# Patient Record
Sex: FEMALE | Race: BLACK OR AFRICAN AMERICAN | NOT HISPANIC OR LATINO
[De-identification: names, ages, dates, MRNs, and addresses within clinical notes are randomized per-mention and may not be internally consistent; named-entity substitution may affect disease eponyms.]

---

## 2018-11-26 ENCOUNTER — RESULT REVIEW (OUTPATIENT)
Age: 28
End: 2018-11-26

## 2019-09-04 ENCOUNTER — RESULT REVIEW (OUTPATIENT)
Age: 29
End: 2019-09-04

## 2020-04-26 ENCOUNTER — MESSAGE (OUTPATIENT)
Age: 30
End: 2020-04-26

## 2020-05-05 LAB
SARS-COV-2 IGG SERPL IA-ACNC: 5.1 INDEX
SARS-COV-2 IGG SERPL QL IA: POSITIVE

## 2020-07-12 ENCOUNTER — TRANSCRIPTION ENCOUNTER (OUTPATIENT)
Age: 30
End: 2020-07-12

## 2020-08-07 ENCOUNTER — APPOINTMENT (OUTPATIENT)
Dept: OBGYN | Facility: CLINIC | Age: 30
End: 2020-08-07
Payer: COMMERCIAL

## 2020-08-07 DIAGNOSIS — Z30.431 ENCOUNTER FOR ROUTINE CHECKING OF INTRAUTERINE CONTRACEPTIVE DEVICE: ICD-10-CM

## 2020-08-07 PROCEDURE — 99213 OFFICE O/P EST LOW 20 MIN: CPT

## 2020-08-07 NOTE — PHYSICAL EXAM
[Normal] : clitoris [Labia Majora] : labia major [No Bleeding] : there was no active vaginal bleeding [Labia Minora] : labia minora [IUD String] : had an IUD string protruding out [Normal Position] : in a normal position [Uterine Adnexae] : were not tender and not enlarged [Motion Tenderness] : there was no cervical motion tenderness [Pap Obtained] : a Pap smear was not performed [Enlarged ___ wks] : not enlarged [Tenderness] : nontender [FreeTextEntry5] : IUD STRING VISIBLE FROM THE EXTERNAL OS AND IS ABOUT 2-3 CM FROM THE OS [Ovarian Mass (___ Cm)] : there were no adnexal masses

## 2020-11-02 LAB — SARS-COV-2 N GENE NPH QL NAA+PROBE: NOT DETECTED

## 2021-02-17 ENCOUNTER — RESULT REVIEW (OUTPATIENT)
Age: 31
End: 2021-02-17

## 2021-03-02 ENCOUNTER — APPOINTMENT (OUTPATIENT)
Dept: OBGYN | Facility: CLINIC | Age: 31
End: 2021-03-02

## 2021-08-27 ENCOUNTER — TRANSCRIPTION ENCOUNTER (OUTPATIENT)
Age: 31
End: 2021-08-27

## 2022-01-28 ENCOUNTER — APPOINTMENT (OUTPATIENT)
Dept: HUMAN REPRODUCTION | Facility: CLINIC | Age: 32
End: 2022-01-28
Payer: COMMERCIAL

## 2022-01-28 PROCEDURE — 99205 OFFICE O/P NEW HI 60 MIN: CPT | Mod: 95

## 2022-05-06 ENCOUNTER — APPOINTMENT (OUTPATIENT)
Dept: HUMAN REPRODUCTION | Facility: CLINIC | Age: 32
End: 2022-05-06

## 2022-07-08 ENCOUNTER — APPOINTMENT (OUTPATIENT)
Dept: ANTEPARTUM | Facility: CLINIC | Age: 32
End: 2022-07-08

## 2022-07-08 ENCOUNTER — ASOB RESULT (OUTPATIENT)
Age: 32
End: 2022-07-08

## 2022-07-08 PROCEDURE — 76817 TRANSVAGINAL US OBSTETRIC: CPT

## 2022-07-08 PROCEDURE — 76801 OB US < 14 WKS SINGLE FETUS: CPT

## 2022-07-10 ENCOUNTER — NON-APPOINTMENT (OUTPATIENT)
Age: 32
End: 2022-07-10

## 2022-07-27 ENCOUNTER — APPOINTMENT (OUTPATIENT)
Dept: OBGYN | Facility: CLINIC | Age: 32
End: 2022-07-27

## 2022-07-27 VITALS
HEIGHT: 67 IN | WEIGHT: 194 LBS | SYSTOLIC BLOOD PRESSURE: 115 MMHG | HEART RATE: 73 BPM | BODY MASS INDEX: 30.45 KG/M2 | DIASTOLIC BLOOD PRESSURE: 77 MMHG

## 2022-07-27 DIAGNOSIS — Z11.3 ENCOUNTER FOR SCREENING FOR INFECTIONS WITH A PREDOMINANTLY SEXUAL MODE OF TRANSMISSION: ICD-10-CM

## 2022-07-27 DIAGNOSIS — Z13.21 ENCOUNTER FOR SCREENING FOR NUTRITIONAL DISORDER: ICD-10-CM

## 2022-07-27 DIAGNOSIS — E55.9 VITAMIN D DEFICIENCY, UNSPECIFIED: ICD-10-CM

## 2022-07-27 DIAGNOSIS — Z11.4 ENCOUNTER FOR SCREENING FOR HUMAN IMMUNODEFICIENCY VIRUS [HIV]: ICD-10-CM

## 2022-07-27 DIAGNOSIS — O09.811 SUPERVISION OF PREGNANCY RESULTING FROM ASSISTED REPRODUCTIVE TECHNOLOGY, FIRST TRIMESTER: ICD-10-CM

## 2022-07-27 DIAGNOSIS — Z34.81 ENCOUNTER FOR SUPERVISION OF OTHER NORMAL PREGNANCY, FIRST TRIMESTER: ICD-10-CM

## 2022-07-27 DIAGNOSIS — O34.81 MATERNAL CARE FOR OTHER ABNORMALITIES OF PELVIC ORGANS, FIRST TRIMESTER: ICD-10-CM

## 2022-07-27 PROCEDURE — 36415 COLL VENOUS BLD VENIPUNCTURE: CPT

## 2022-07-27 PROCEDURE — 0500F INITIAL PRENATAL CARE VISIT: CPT

## 2022-07-31 LAB
25(OH)D3 SERPL-MCNC: 38.7 NG/ML
ABO + RH PNL BLD: NORMAL
B19V IGG SER QL IA: 0.68 INDEX
B19V IGG+IGM SER-IMP: NEGATIVE
B19V IGG+IGM SER-IMP: NORMAL
B19V IGM FLD-ACNC: 0.09 INDEX
B19V IGM SER-ACNC: NEGATIVE
BACTERIA UR CULT: NORMAL
BASOPHILS # BLD AUTO: 0.07 K/UL
BASOPHILS NFR BLD AUTO: 0.4 %
BLD GP AB SCN SERPL QL: NORMAL
C TRACH RRNA SPEC QL NAA+PROBE: NOT DETECTED
EOSINOPHIL # BLD AUTO: 0.09 K/UL
EOSINOPHIL NFR BLD AUTO: 0.6 %
ESTIMATED AVERAGE GLUCOSE: 103 MG/DL
HBA1C MFR BLD HPLC: 5.2 %
HBV SURFACE AG SER QL: NONREACTIVE
HCT VFR BLD CALC: 37.6 %
HCV AB SER QL: NONREACTIVE
HCV S/CO RATIO: 0.11 S/CO
HGB A MFR BLD: 97.1 %
HGB A2 MFR BLD: 2.9 %
HGB BLD-MCNC: 12.1 G/DL
HGB FRACT BLD-IMP: NORMAL
HIV1+2 AB SPEC QL IA.RAPID: NONREACTIVE
IMM GRANULOCYTES NFR BLD AUTO: 0.4 %
LEAD BLD-MCNC: <1 UG/DL
LYMPHOCYTES # BLD AUTO: 2.22 K/UL
LYMPHOCYTES NFR BLD AUTO: 14.2 %
MAN DIFF?: NORMAL
MCHC RBC-ENTMCNC: 29.4 PG
MCHC RBC-ENTMCNC: 32.2 GM/DL
MCV RBC AUTO: 91.5 FL
MEV IGG FLD QL IA: 55.2 AU/ML
MEV IGG+IGM SER-IMP: POSITIVE
MONOCYTES # BLD AUTO: 1.22 K/UL
MONOCYTES NFR BLD AUTO: 7.8 %
MUV AB SER-ACNC: POSITIVE
MUV IGG SER QL IA: 88.5 AU/ML
N GONORRHOEA RRNA SPEC QL NAA+PROBE: NOT DETECTED
NEUTROPHILS # BLD AUTO: 11.97 K/UL
NEUTROPHILS NFR BLD AUTO: 76.6 %
PLATELET # BLD AUTO: 338 K/UL
RBC # BLD: 4.11 M/UL
RBC # FLD: 13.5 %
RUBV IGG FLD-ACNC: 5.6 INDEX
RUBV IGG SER-IMP: POSITIVE
SOURCE AMPLIFICATION: NORMAL
T PALLIDUM AB SER QL IA: NEGATIVE
TSH SERPL-ACNC: 0.66 UIU/ML
VZV AB TITR SER: POSITIVE
VZV IGG SER IF-ACNC: 1146 INDEX
WBC # FLD AUTO: 15.64 K/UL

## 2022-08-01 ENCOUNTER — NON-APPOINTMENT (OUTPATIENT)
Age: 32
End: 2022-08-01

## 2022-08-01 ENCOUNTER — APPOINTMENT (OUTPATIENT)
Dept: OBGYN | Facility: CLINIC | Age: 32
End: 2022-08-01

## 2022-08-01 DIAGNOSIS — R82.90 UNSPECIFIED ABNORMAL FINDINGS IN URINE: ICD-10-CM

## 2022-08-01 PROCEDURE — 36415 COLL VENOUS BLD VENIPUNCTURE: CPT

## 2022-08-07 LAB — BACTERIA UR CULT: NORMAL

## 2022-08-11 ENCOUNTER — NON-APPOINTMENT (OUTPATIENT)
Age: 32
End: 2022-08-11

## 2022-08-12 ENCOUNTER — NON-APPOINTMENT (OUTPATIENT)
Age: 32
End: 2022-08-12

## 2022-08-12 ENCOUNTER — APPOINTMENT (OUTPATIENT)
Dept: ANTEPARTUM | Facility: CLINIC | Age: 32
End: 2022-08-12

## 2022-08-12 ENCOUNTER — LABORATORY RESULT (OUTPATIENT)
Age: 32
End: 2022-08-12

## 2022-08-12 ENCOUNTER — ASOB RESULT (OUTPATIENT)
Age: 32
End: 2022-08-12

## 2022-08-12 PROCEDURE — 59015 CHORION BIOPSY: CPT

## 2022-08-12 PROCEDURE — 76945 ECHO GUIDE VILLUS SAMPLING: CPT

## 2022-08-12 PROCEDURE — 76813 OB US NUCHAL MEAS 1 GEST: CPT

## 2022-08-15 ENCOUNTER — APPOINTMENT (OUTPATIENT)
Dept: ANTEPARTUM | Facility: CLINIC | Age: 32
End: 2022-08-15

## 2022-08-15 ENCOUNTER — NON-APPOINTMENT (OUTPATIENT)
Age: 32
End: 2022-08-15

## 2022-08-29 RX ORDER — DOXYLAMINE SUCCINATE AND PYRIDOXINE HYDROCHLORIDE 10; 10 MG/1; MG/1
10-10 TABLET, DELAYED RELEASE ORAL
Qty: 60 | Refills: 2 | Status: ACTIVE | COMMUNITY
Start: 2022-08-29 | End: 1900-01-01

## 2022-09-12 ENCOUNTER — NON-APPOINTMENT (OUTPATIENT)
Age: 32
End: 2022-09-12

## 2022-09-12 ENCOUNTER — APPOINTMENT (OUTPATIENT)
Dept: OBGYN | Facility: CLINIC | Age: 32
End: 2022-09-12

## 2022-09-12 VITALS
BODY MASS INDEX: 31.23 KG/M2 | DIASTOLIC BLOOD PRESSURE: 78 MMHG | SYSTOLIC BLOOD PRESSURE: 118 MMHG | HEART RATE: 82 BPM | HEIGHT: 67 IN | WEIGHT: 199 LBS

## 2022-09-12 DIAGNOSIS — Z34.92 ENCOUNTER FOR SUPERVISION OF NORMAL PREGNANCY, UNSPECIFIED, SECOND TRIMESTER: ICD-10-CM

## 2022-09-12 PROCEDURE — 36415 COLL VENOUS BLD VENIPUNCTURE: CPT

## 2022-09-12 PROCEDURE — 0502F SUBSEQUENT PRENATAL CARE: CPT

## 2022-09-13 LAB — HPV HIGH+LOW RISK DNA PNL CVX: DETECTED

## 2022-09-17 LAB — AFP PNL SERPL: NORMAL

## 2022-09-23 LAB — CYTOLOGY CVX/VAG DOC THIN PREP: ABNORMAL

## 2022-09-28 ENCOUNTER — NON-APPOINTMENT (OUTPATIENT)
Age: 32
End: 2022-09-28

## 2022-09-29 ENCOUNTER — NON-APPOINTMENT (OUTPATIENT)
Age: 32
End: 2022-09-29

## 2022-10-06 ENCOUNTER — ASOB RESULT (OUTPATIENT)
Age: 32
End: 2022-10-06

## 2022-10-06 ENCOUNTER — APPOINTMENT (OUTPATIENT)
Dept: ANTEPARTUM | Facility: CLINIC | Age: 32
End: 2022-10-06

## 2022-10-06 PROCEDURE — 76811 OB US DETAILED SNGL FETUS: CPT

## 2022-10-11 ENCOUNTER — APPOINTMENT (OUTPATIENT)
Dept: OBGYN | Facility: CLINIC | Age: 32
End: 2022-10-11

## 2022-10-19 ENCOUNTER — APPOINTMENT (OUTPATIENT)
Dept: OBGYN | Facility: CLINIC | Age: 32
End: 2022-10-19

## 2022-10-19 PROCEDURE — 0502F SUBSEQUENT PRENATAL CARE: CPT

## 2022-10-19 PROCEDURE — 57454 BX/CURETT OF CERVIX W/SCOPE: CPT

## 2022-10-21 ENCOUNTER — APPOINTMENT (OUTPATIENT)
Dept: PEDIATRIC CARDIOLOGY | Facility: CLINIC | Age: 32
End: 2022-10-21

## 2022-10-21 PROCEDURE — 93325 DOPPLER ECHO COLOR FLOW MAPG: CPT | Mod: 59

## 2022-10-21 PROCEDURE — 76820 UMBILICAL ARTERY ECHO: CPT

## 2022-10-21 PROCEDURE — 76827 ECHO EXAM OF FETAL HEART: CPT

## 2022-10-21 PROCEDURE — 76825 ECHO EXAM OF FETAL HEART: CPT

## 2022-10-21 PROCEDURE — 99241 OFFICE CONSULTATION NEW/ESTAB PATIENT 15 MIN: CPT | Mod: 25

## 2022-11-18 ENCOUNTER — NON-APPOINTMENT (OUTPATIENT)
Age: 32
End: 2022-11-18

## 2022-11-21 ENCOUNTER — APPOINTMENT (OUTPATIENT)
Dept: OBGYN | Facility: CLINIC | Age: 32
End: 2022-11-21

## 2022-11-28 ENCOUNTER — NON-APPOINTMENT (OUTPATIENT)
Age: 32
End: 2022-11-28

## 2022-11-28 ENCOUNTER — APPOINTMENT (OUTPATIENT)
Dept: OBGYN | Facility: CLINIC | Age: 32
End: 2022-11-28

## 2022-11-28 DIAGNOSIS — Z13.1 ENCOUNTER FOR SCREENING FOR DIABETES MELLITUS: ICD-10-CM

## 2022-11-28 DIAGNOSIS — Z11.3 ENCOUNTER FOR SCREENING FOR INFECTIONS WITH A PREDOMINANTLY SEXUAL MODE OF TRANSMISSION: ICD-10-CM

## 2022-11-28 DIAGNOSIS — Z23 ENCOUNTER FOR IMMUNIZATION: ICD-10-CM

## 2022-11-28 DIAGNOSIS — Z13.21 ENCOUNTER FOR SCREENING FOR NUTRITIONAL DISORDER: ICD-10-CM

## 2022-11-28 DIAGNOSIS — E55.9 VITAMIN D DEFICIENCY, UNSPECIFIED: ICD-10-CM

## 2022-11-28 DIAGNOSIS — O09.812 SUPERVISION OF PREGNANCY RESULTING FROM ASSISTED REPRODUCTIVE TECHNOLOGY, SECOND TRIMESTER: ICD-10-CM

## 2022-11-28 DIAGNOSIS — Z11.4 ENCOUNTER FOR SCREENING FOR HUMAN IMMUNODEFICIENCY VIRUS [HIV]: ICD-10-CM

## 2022-11-28 DIAGNOSIS — D64.9 ANEMIA, UNSPECIFIED: ICD-10-CM

## 2022-11-28 DIAGNOSIS — Z01.84 ENCOUNTER FOR ANTIBODY RESPONSE EXAMINATION: ICD-10-CM

## 2022-11-28 DIAGNOSIS — Z34.83 ENCOUNTER FOR SUPERVISION OF OTHER NORMAL PREGNANCY, THIRD TRIMESTER: ICD-10-CM

## 2022-11-28 PROCEDURE — 90471 IMMUNIZATION ADMIN: CPT

## 2022-11-28 PROCEDURE — 90715 TDAP VACCINE 7 YRS/> IM: CPT

## 2022-11-28 PROCEDURE — 36415 COLL VENOUS BLD VENIPUNCTURE: CPT

## 2022-11-28 PROCEDURE — 0502F SUBSEQUENT PRENATAL CARE: CPT

## 2022-11-29 LAB
25(OH)D3 SERPL-MCNC: 36.5 NG/ML
BASOPHILS # BLD AUTO: 0.06 K/UL
BASOPHILS NFR BLD AUTO: 0.4 %
BLD GP AB SCN SERPL QL: NORMAL
EOSINOPHIL # BLD AUTO: 0.16 K/UL
EOSINOPHIL NFR BLD AUTO: 1.2 %
GLUCOSE 1H P 50 G GLC PO SERPL-MCNC: 81 MG/DL
HCT VFR BLD CALC: 34.9 %
HGB BLD-MCNC: 11.1 G/DL
IMM GRANULOCYTES NFR BLD AUTO: 1 %
LYMPHOCYTES # BLD AUTO: 1.89 K/UL
LYMPHOCYTES NFR BLD AUTO: 14 %
MAN DIFF?: NORMAL
MCHC RBC-ENTMCNC: 28.8 PG
MCHC RBC-ENTMCNC: 31.8 GM/DL
MCV RBC AUTO: 90.4 FL
MONOCYTES # BLD AUTO: 1.18 K/UL
MONOCYTES NFR BLD AUTO: 8.7 %
NEUTROPHILS # BLD AUTO: 10.11 K/UL
NEUTROPHILS NFR BLD AUTO: 74.7 %
PLATELET # BLD AUTO: 310 K/UL
RBC # BLD: 3.86 M/UL
RBC # FLD: 13.4 %
WBC # FLD AUTO: 13.53 K/UL

## 2022-11-30 LAB
HCV AB SER QL: NONREACTIVE
HCV S/CO RATIO: 0.08 S/CO
HIV1+2 AB SPEC QL IA.RAPID: NONREACTIVE
T PALLIDUM AB SER QL IA: NEGATIVE

## 2022-12-22 ENCOUNTER — NON-APPOINTMENT (OUTPATIENT)
Age: 32
End: 2022-12-22

## 2022-12-22 ENCOUNTER — APPOINTMENT (OUTPATIENT)
Dept: OBGYN | Facility: CLINIC | Age: 32
End: 2022-12-22

## 2022-12-22 VITALS
WEIGHT: 228 LBS | BODY MASS INDEX: 35.79 KG/M2 | HEIGHT: 67 IN | DIASTOLIC BLOOD PRESSURE: 64 MMHG | SYSTOLIC BLOOD PRESSURE: 124 MMHG

## 2022-12-22 PROCEDURE — 0502F SUBSEQUENT PRENATAL CARE: CPT

## 2022-12-30 ENCOUNTER — APPOINTMENT (OUTPATIENT)
Dept: ANTEPARTUM | Facility: CLINIC | Age: 32
End: 2022-12-30
Payer: COMMERCIAL

## 2022-12-30 ENCOUNTER — ASOB RESULT (OUTPATIENT)
Age: 32
End: 2022-12-30

## 2022-12-30 PROCEDURE — 76816 OB US FOLLOW-UP PER FETUS: CPT

## 2022-12-30 PROCEDURE — 76819 FETAL BIOPHYS PROFIL W/O NST: CPT | Mod: 59

## 2023-01-11 ENCOUNTER — APPOINTMENT (OUTPATIENT)
Dept: OBGYN | Facility: CLINIC | Age: 33
End: 2023-01-11
Payer: COMMERCIAL

## 2023-01-11 ENCOUNTER — NON-APPOINTMENT (OUTPATIENT)
Age: 33
End: 2023-01-11

## 2023-01-11 PROCEDURE — 0502F SUBSEQUENT PRENATAL CARE: CPT

## 2023-01-11 PROCEDURE — 90686 IIV4 VACC NO PRSV 0.5 ML IM: CPT

## 2023-01-11 PROCEDURE — 90471 IMMUNIZATION ADMIN: CPT

## 2023-01-18 ENCOUNTER — NON-APPOINTMENT (OUTPATIENT)
Age: 33
End: 2023-01-18

## 2023-01-23 ENCOUNTER — APPOINTMENT (OUTPATIENT)
Dept: OBGYN | Facility: CLINIC | Age: 33
End: 2023-01-23
Payer: COMMERCIAL

## 2023-01-23 DIAGNOSIS — Z36.85 ENCOUNTER FOR ANTENATAL SCREENING FOR STREPTOCOCCUS B: ICD-10-CM

## 2023-01-23 PROCEDURE — 0502F SUBSEQUENT PRENATAL CARE: CPT

## 2023-01-25 LAB
GP B STREP DNA SPEC QL NAA+PROBE: DETECTED
SOURCE GBS: NORMAL

## 2023-02-01 ENCOUNTER — APPOINTMENT (OUTPATIENT)
Dept: OBGYN | Facility: CLINIC | Age: 33
End: 2023-02-01
Payer: COMMERCIAL

## 2023-02-01 DIAGNOSIS — O34.219 MATERNAL CARE FOR UNSPECIFIED TYPE SCAR FROM PREVIOUS CESAREAN DELIVERY: ICD-10-CM

## 2023-02-01 DIAGNOSIS — O09.813 SUPERVISION OF PREGNANCY RESULTING FROM ASSISTED REPRODUCTIVE TECHNOLOGY, THIRD TRIMESTER: ICD-10-CM

## 2023-02-01 PROCEDURE — 0502F SUBSEQUENT PRENATAL CARE: CPT

## 2023-02-06 ENCOUNTER — APPOINTMENT (OUTPATIENT)
Dept: OBGYN | Facility: CLINIC | Age: 33
End: 2023-02-06

## 2023-02-09 ENCOUNTER — OUTPATIENT (OUTPATIENT)
Dept: OUTPATIENT SERVICES | Facility: HOSPITAL | Age: 33
LOS: 1 days | End: 2023-02-09
Payer: COMMERCIAL

## 2023-02-09 VITALS
TEMPERATURE: 98 F | HEIGHT: 67 IN | RESPIRATION RATE: 20 BRPM | HEART RATE: 101 BPM | SYSTOLIC BLOOD PRESSURE: 117 MMHG | DIASTOLIC BLOOD PRESSURE: 78 MMHG | WEIGHT: 231.93 LBS | OXYGEN SATURATION: 98 %

## 2023-02-09 DIAGNOSIS — Z01.818 ENCOUNTER FOR OTHER PREPROCEDURAL EXAMINATION: ICD-10-CM

## 2023-02-09 DIAGNOSIS — Z98.891 HISTORY OF UTERINE SCAR FROM PREVIOUS SURGERY: Chronic | ICD-10-CM

## 2023-02-09 DIAGNOSIS — O34.219 MATERNAL CARE FOR UNSPECIFIED TYPE SCAR FROM PREVIOUS CESAREAN DELIVERY: ICD-10-CM

## 2023-02-09 DIAGNOSIS — Z34.90 ENCOUNTER FOR SUPERVISION OF NORMAL PREGNANCY, UNSPECIFIED, UNSPECIFIED TRIMESTER: ICD-10-CM

## 2023-02-09 LAB
ANION GAP SERPL CALC-SCNC: 13 MMOL/L — SIGNIFICANT CHANGE UP (ref 5–17)
BLD GP AB SCN SERPL QL: NEGATIVE — SIGNIFICANT CHANGE UP
BUN SERPL-MCNC: 8 MG/DL — SIGNIFICANT CHANGE UP (ref 7–23)
CALCIUM SERPL-MCNC: 9.6 MG/DL — SIGNIFICANT CHANGE UP (ref 8.4–10.5)
CHLORIDE SERPL-SCNC: 104 MMOL/L — SIGNIFICANT CHANGE UP (ref 96–108)
CO2 SERPL-SCNC: 22 MMOL/L — SIGNIFICANT CHANGE UP (ref 22–31)
CREAT SERPL-MCNC: 0.57 MG/DL — SIGNIFICANT CHANGE UP (ref 0.5–1.3)
EGFR: 124 ML/MIN/1.73M2 — SIGNIFICANT CHANGE UP
GLUCOSE SERPL-MCNC: 93 MG/DL — SIGNIFICANT CHANGE UP (ref 70–99)
HCT VFR BLD CALC: 35.3 % — SIGNIFICANT CHANGE UP (ref 34.5–45)
HGB BLD-MCNC: 11.2 G/DL — LOW (ref 11.5–15.5)
MCHC RBC-ENTMCNC: 28.4 PG — SIGNIFICANT CHANGE UP (ref 27–34)
MCHC RBC-ENTMCNC: 31.7 GM/DL — LOW (ref 32–36)
MCV RBC AUTO: 89.4 FL — SIGNIFICANT CHANGE UP (ref 80–100)
NRBC # BLD: 0 /100 WBCS — SIGNIFICANT CHANGE UP (ref 0–0)
PLATELET # BLD AUTO: 265 K/UL — SIGNIFICANT CHANGE UP (ref 150–400)
POTASSIUM SERPL-MCNC: 3.8 MMOL/L — SIGNIFICANT CHANGE UP (ref 3.5–5.3)
POTASSIUM SERPL-SCNC: 3.8 MMOL/L — SIGNIFICANT CHANGE UP (ref 3.5–5.3)
RBC # BLD: 3.95 M/UL — SIGNIFICANT CHANGE UP (ref 3.8–5.2)
RBC # FLD: 14.3 % — SIGNIFICANT CHANGE UP (ref 10.3–14.5)
RH IG SCN BLD-IMP: POSITIVE — SIGNIFICANT CHANGE UP
SODIUM SERPL-SCNC: 139 MMOL/L — SIGNIFICANT CHANGE UP (ref 135–145)
WBC # BLD: 13.35 K/UL — HIGH (ref 3.8–10.5)
WBC # FLD AUTO: 13.35 K/UL — HIGH (ref 3.8–10.5)

## 2023-02-09 PROCEDURE — 86850 RBC ANTIBODY SCREEN: CPT

## 2023-02-09 PROCEDURE — 80048 BASIC METABOLIC PNL TOTAL CA: CPT

## 2023-02-09 PROCEDURE — 85027 COMPLETE CBC AUTOMATED: CPT

## 2023-02-09 PROCEDURE — G0463: CPT

## 2023-02-09 PROCEDURE — 86901 BLOOD TYPING SEROLOGIC RH(D): CPT

## 2023-02-09 PROCEDURE — 86900 BLOOD TYPING SEROLOGIC ABO: CPT

## 2023-02-09 PROCEDURE — 36415 COLL VENOUS BLD VENIPUNCTURE: CPT

## 2023-02-09 RX ORDER — CHLORHEXIDINE GLUCONATE 213 G/1000ML
1 SOLUTION TOPICAL ONCE
Refills: 0 | Status: DISCONTINUED | OUTPATIENT
Start: 2023-02-28 | End: 2023-03-02

## 2023-02-09 RX ORDER — CEFAZOLIN SODIUM 1 G
2000 VIAL (EA) INJECTION ONCE
Refills: 0 | Status: COMPLETED | OUTPATIENT
Start: 2023-02-28 | End: 2023-02-28

## 2023-02-09 RX ORDER — ASPIRIN/CALCIUM CARB/MAGNESIUM 324 MG
0 TABLET ORAL
Qty: 0 | Refills: 0 | DISCHARGE

## 2023-02-09 RX ORDER — FERROUS SULFATE 325(65) MG
1 TABLET ORAL
Qty: 0 | Refills: 0 | DISCHARGE

## 2023-02-09 NOTE — OB PST NOTE - NSANTHOSAYNRD_GEN_A_CORE
No. SHANKAR screening performed.  STOP BANG Legend: 0-2 = LOW Risk; 3-4 = INTERMEDIATE Risk; 5-8 = HIGH Risk
no

## 2023-02-09 NOTE — OB PST NOTE - NSICDXPASTMEDICALHX_GEN_ALL_CORE_FT
PAST MEDICAL HISTORY:  History of heartburn     History of herniated intervertebral disc     History of sciatica      PAST MEDICAL HISTORY:  Currently pregnant     History of heartburn     History of herniated intervertebral disc     History of sciatica

## 2023-02-09 NOTE — OB PST NOTE - ASSESSMENT
CAPRINI SCORE [CLOT updated 18]    AGE RELATED RISK FACTORS                                                       MOBILITY RELATED FACTORS  [ ] Age 41-60 years                                            (1 Point)                    [ ] Bed rest                                                        (1 Point)  [ ] Age: 61-74 years                                           (2 Points)                  [ ] Plaster cast                                                   (2 Points)  [ ] Age= 75 years                                              (3 Points)                    [ ] Bed bound for more than 72 hours                 (2 Points)    DISEASE RELATED RISK FACTORS                                               GENDER SPECIFIC FACTORS  [ ] Edema in the lower extremities                       (1 Point)              [1 ] Pregnancy                                                     (1 Point)  [ ] Varicose veins                                               (1 Point)                     [ ] Post-partum < 6 weeks                                   (1 Point)             [1 ] BMI > 25 Kg/m2                                            (1 Point)                     [ ] Hormonal therapy  or oral contraception          (1 Point)                 [ ] Sepsis (in the previous month)                        (1 Point)               [ ] History of pregnancy complications                 (1 point)  [ ] Pneumonia or serious lung disease                                               [ ] Unexplained or recurrent                     (1 Point)           (in the previous month)                               (1 Point)  [ ] Abnormal pulmonary function test                     (1 Point)                 SURGERY RELATED RISK FACTORS  [ ] Acute myocardial infarction                              (1 Point)               [1 ]  Section                                             (1 Point)  [ ] Congestive heart failure (in the previous month)  (1 Point)      [ ] Minor surgery                                                  (1 Point)   [ ] Inflammatory bowel disease                             (1 Point)               [ ] Arthroscopic surgery                                        (2 Points)  [ ] Central venous access                                      (2 Points)                [ ] General surgery lasting more than 45 minutes (2 points)  [ ] Present or previous malignancy                     (2 Points)                [ ] Elective arthroplasty                                         (5 points)    [ ] Stroke (in the previous month)                          (5 Points)                                                                                                                                                           HEMATOLOGY RELATED FACTORS                                                 TRAUMA RELATED RISK FACTORS  [ ] Prior episodes of VTE                                     (3 Points)                [ ] Fracture of the hip, pelvis, or leg                       (5 Points)  [ ] Positive family history for VTE                         (3 Points)             [ ] Acute spinal cord injury (in the previous month)  (5 Points)  [ ] Prothrombin 97154 A                                     (3 Points)               [ ] Paralysis  (less than 1 month)                             (5 Points)  [ ] Factor V Leiden                                             (3 Points)                  [ ] Multiple Trauma within 1 month                        (5 Points)  [ ] Lupus anticoagulants                                     (3 Points)                                                           [ ] Anticardiolipin antibodies                               (3 Points)                                                       [ ] High homocysteine in the blood                      (3 Points)                                             [ ] Other congenital or acquired thrombophilia      (3 Points)                                                [ ] Heparin induced thrombocytopenia                  (3 Points)                                     Total Score [ 3 ]

## 2023-02-09 NOTE — OB PST NOTE - HISTORY OF PRESENT ILLNESS
32 year old , presents for preop testing at 37w6d gestation for scheduled repeat  on 2023. Pt reports mild pop aggarwal contraction since week 36, no vaginal spotting or bleeding, feeling baby movement. Her medical history significant for L2 herniated disc, heartburn, sciatica, covid-19 infection x3 (last 2022, mild symptoms, no hospitalization).   She is scheduled for covid-19 PCR test as required prior to scheduled

## 2023-02-09 NOTE — OB PST NOTE - FALL HARM RISK - UNIVERSAL INTERVENTIONS
Bed in lowest position, wheels locked, appropriate side rails in place/Call bell, personal items and telephone in reach/Instruct patient to call for assistance before getting out of bed or chair/Non-slip footwear when patient is out of bed/[place] to call system/Physically safe environment - no spills, clutter or unnecessary equipment/Purposeful Proactive Rounding/Room/bathroom lighting operational, light cord in reach

## 2023-02-10 DIAGNOSIS — Z91.89 OTHER SPECIFIED PERSONAL RISK FACTORS, NOT ELSEWHERE CLASSIFIED: ICD-10-CM

## 2023-02-14 ENCOUNTER — NON-APPOINTMENT (OUTPATIENT)
Age: 33
End: 2023-02-14

## 2023-02-16 ENCOUNTER — APPOINTMENT (OUTPATIENT)
Dept: OBGYN | Facility: CLINIC | Age: 33
End: 2023-02-16
Payer: COMMERCIAL

## 2023-02-16 VITALS
SYSTOLIC BLOOD PRESSURE: 119 MMHG | HEIGHT: 67 IN | WEIGHT: 233 LBS | BODY MASS INDEX: 36.57 KG/M2 | HEART RATE: 89 BPM | DIASTOLIC BLOOD PRESSURE: 77 MMHG

## 2023-02-16 PROCEDURE — 0502F SUBSEQUENT PRENATAL CARE: CPT

## 2023-02-22 ENCOUNTER — APPOINTMENT (OUTPATIENT)
Dept: ANTEPARTUM | Facility: CLINIC | Age: 33
End: 2023-02-22
Payer: COMMERCIAL

## 2023-02-22 ENCOUNTER — ASOB RESULT (OUTPATIENT)
Age: 33
End: 2023-02-22

## 2023-02-22 PROBLEM — Z34.90 ENCOUNTER FOR SUPERVISION OF NORMAL PREGNANCY, UNSPECIFIED, UNSPECIFIED TRIMESTER: Chronic | Status: ACTIVE | Noted: 2023-02-09

## 2023-02-22 PROBLEM — Z87.39 PERSONAL HISTORY OF OTHER DISEASES OF THE MUSCULOSKELETAL SYSTEM AND CONNECTIVE TISSUE: Chronic | Status: ACTIVE | Noted: 2023-02-09

## 2023-02-22 PROBLEM — Z87.898 PERSONAL HISTORY OF OTHER SPECIFIED CONDITIONS: Chronic | Status: ACTIVE | Noted: 2023-02-09

## 2023-02-22 PROBLEM — Z86.69 PERSONAL HISTORY OF OTHER DISEASES OF THE NERVOUS SYSTEM AND SENSE ORGANS: Chronic | Status: ACTIVE | Noted: 2023-02-09

## 2023-02-22 PROCEDURE — 76816 OB US FOLLOW-UP PER FETUS: CPT

## 2023-02-22 PROCEDURE — 76819 FETAL BIOPHYS PROFIL W/O NST: CPT | Mod: 59

## 2023-02-23 ENCOUNTER — NON-APPOINTMENT (OUTPATIENT)
Age: 33
End: 2023-02-23

## 2023-02-23 ENCOUNTER — APPOINTMENT (OUTPATIENT)
Dept: OBGYN | Facility: CLINIC | Age: 33
End: 2023-02-23
Payer: COMMERCIAL

## 2023-02-23 PROCEDURE — 0502F SUBSEQUENT PRENATAL CARE: CPT

## 2023-02-23 PROCEDURE — 59025 FETAL NON-STRESS TEST: CPT

## 2023-02-26 ENCOUNTER — OUTPATIENT (OUTPATIENT)
Dept: OUTPATIENT SERVICES | Facility: HOSPITAL | Age: 33
LOS: 1 days | End: 2023-02-26
Payer: COMMERCIAL

## 2023-02-26 DIAGNOSIS — Z98.891 HISTORY OF UTERINE SCAR FROM PREVIOUS SURGERY: Chronic | ICD-10-CM

## 2023-02-26 DIAGNOSIS — O34.219 MATERNAL CARE FOR UNSPECIFIED TYPE SCAR FROM PREVIOUS CESAREAN DELIVERY: ICD-10-CM

## 2023-02-26 DIAGNOSIS — Z11.52 ENCOUNTER FOR SCREENING FOR COVID-19: ICD-10-CM

## 2023-02-26 LAB — SARS-COV-2 RNA SPEC QL NAA+PROBE: SIGNIFICANT CHANGE UP

## 2023-02-26 PROCEDURE — U0003: CPT

## 2023-02-26 PROCEDURE — U0005: CPT

## 2023-02-26 PROCEDURE — C9803: CPT

## 2023-02-27 ENCOUNTER — TRANSCRIPTION ENCOUNTER (OUTPATIENT)
Age: 33
End: 2023-02-27

## 2023-02-28 ENCOUNTER — NON-APPOINTMENT (OUTPATIENT)
Age: 33
End: 2023-02-28

## 2023-02-28 ENCOUNTER — APPOINTMENT (OUTPATIENT)
Dept: OBGYN | Facility: HOSPITAL | Age: 33
End: 2023-02-28

## 2023-02-28 ENCOUNTER — INPATIENT (INPATIENT)
Facility: HOSPITAL | Age: 33
LOS: 1 days | Discharge: HOME | End: 2023-03-02
Attending: OBSTETRICS & GYNECOLOGY | Admitting: OBSTETRICS & GYNECOLOGY
Payer: COMMERCIAL

## 2023-02-28 VITALS
WEIGHT: 235.01 LBS | OXYGEN SATURATION: 99 % | DIASTOLIC BLOOD PRESSURE: 71 MMHG | SYSTOLIC BLOOD PRESSURE: 119 MMHG | TEMPERATURE: 98 F | RESPIRATION RATE: 18 BRPM | HEART RATE: 90 BPM | HEIGHT: 67 IN

## 2023-02-28 DIAGNOSIS — Z98.891 HISTORY OF UTERINE SCAR FROM PREVIOUS SURGERY: Chronic | ICD-10-CM

## 2023-02-28 DIAGNOSIS — Z98.891 HISTORY OF UTERINE SCAR FROM PREVIOUS SURGERY: ICD-10-CM

## 2023-02-28 DIAGNOSIS — O34.219 MATERNAL CARE FOR UNSPECIFIED TYPE SCAR FROM PREVIOUS CESAREAN DELIVERY: ICD-10-CM

## 2023-02-28 LAB
COVID-19 SPIKE DOMAIN AB INTERP: POSITIVE
COVID-19 SPIKE DOMAIN ANTIBODY RESULT: >250 U/ML — HIGH
HCT VFR BLD CALC: 36.1 % — SIGNIFICANT CHANGE UP (ref 34.5–45)
HCT VFR BLD CALC: 36.5 % — SIGNIFICANT CHANGE UP (ref 34.5–45)
HGB BLD-MCNC: 11.8 G/DL — SIGNIFICANT CHANGE UP (ref 11.5–15.5)
HGB BLD-MCNC: 11.9 G/DL — SIGNIFICANT CHANGE UP (ref 11.5–15.5)
MCHC RBC-ENTMCNC: 28.4 PG — SIGNIFICANT CHANGE UP (ref 27–34)
MCHC RBC-ENTMCNC: 28.8 PG — SIGNIFICANT CHANGE UP (ref 27–34)
MCHC RBC-ENTMCNC: 32.3 GM/DL — SIGNIFICANT CHANGE UP (ref 32–36)
MCHC RBC-ENTMCNC: 33 GM/DL — SIGNIFICANT CHANGE UP (ref 32–36)
MCV RBC AUTO: 87.4 FL — SIGNIFICANT CHANGE UP (ref 80–100)
MCV RBC AUTO: 87.7 FL — SIGNIFICANT CHANGE UP (ref 80–100)
NRBC # BLD: 0 /100 WBCS — SIGNIFICANT CHANGE UP (ref 0–0)
NRBC # BLD: 0 /100 WBCS — SIGNIFICANT CHANGE UP (ref 0–0)
PLATELET # BLD AUTO: 247 K/UL — SIGNIFICANT CHANGE UP (ref 150–400)
PLATELET # BLD AUTO: 264 K/UL — SIGNIFICANT CHANGE UP (ref 150–400)
RBC # BLD: 4.13 M/UL — SIGNIFICANT CHANGE UP (ref 3.8–5.2)
RBC # BLD: 4.16 M/UL — SIGNIFICANT CHANGE UP (ref 3.8–5.2)
RBC # FLD: 15 % — HIGH (ref 10.3–14.5)
RBC # FLD: 15.2 % — HIGH (ref 10.3–14.5)
SARS-COV-2 IGG+IGM SERPL QL IA: >250 U/ML — HIGH
SARS-COV-2 IGG+IGM SERPL QL IA: POSITIVE
T PALLIDUM AB TITR SER: NEGATIVE — SIGNIFICANT CHANGE UP
WBC # BLD: 10.05 K/UL — SIGNIFICANT CHANGE UP (ref 3.8–10.5)
WBC # BLD: 19.67 K/UL — HIGH (ref 3.8–10.5)
WBC # FLD AUTO: 10.05 K/UL — SIGNIFICANT CHANGE UP (ref 3.8–10.5)
WBC # FLD AUTO: 19.67 K/UL — HIGH (ref 3.8–10.5)

## 2023-02-28 PROCEDURE — 59510 CESAREAN DELIVERY: CPT

## 2023-02-28 PROCEDURE — 59514 CESAREAN DELIVERY ONLY: CPT | Mod: AS

## 2023-02-28 DEVICE — SURGICEL POWDER 3 GRAMS: Type: IMPLANTABLE DEVICE | Status: FUNCTIONAL

## 2023-02-28 RX ORDER — FAMOTIDINE 10 MG/ML
20 INJECTION INTRAVENOUS ONCE
Refills: 0 | Status: COMPLETED | OUTPATIENT
Start: 2023-02-28 | End: 2023-02-28

## 2023-02-28 RX ORDER — CITRIC ACID/SODIUM CITRATE 300-500 MG
15 SOLUTION, ORAL ORAL ONCE
Refills: 0 | Status: COMPLETED | OUTPATIENT
Start: 2023-02-28 | End: 2023-02-28

## 2023-02-28 RX ORDER — OXYCODONE HYDROCHLORIDE 5 MG/1
5 TABLET ORAL ONCE
Refills: 0 | Status: DISCONTINUED | OUTPATIENT
Start: 2023-02-28 | End: 2023-03-02

## 2023-02-28 RX ORDER — HEPARIN SODIUM 5000 [USP'U]/ML
5000 INJECTION INTRAVENOUS; SUBCUTANEOUS EVERY 12 HOURS
Refills: 0 | Status: DISCONTINUED | OUTPATIENT
Start: 2023-02-28 | End: 2023-03-02

## 2023-02-28 RX ORDER — OXYCODONE HYDROCHLORIDE 5 MG/1
5 TABLET ORAL
Refills: 0 | Status: DISCONTINUED | OUTPATIENT
Start: 2023-02-28 | End: 2023-03-02

## 2023-02-28 RX ORDER — MAGNESIUM HYDROXIDE 400 MG/1
30 TABLET, CHEWABLE ORAL
Refills: 0 | Status: DISCONTINUED | OUTPATIENT
Start: 2023-02-28 | End: 2023-03-02

## 2023-02-28 RX ORDER — ACETAMINOPHEN 500 MG
975 TABLET ORAL
Refills: 0 | Status: DISCONTINUED | OUTPATIENT
Start: 2023-02-28 | End: 2023-03-02

## 2023-02-28 RX ORDER — OXYCODONE HYDROCHLORIDE 5 MG/1
10 TABLET ORAL
Refills: 0 | Status: DISCONTINUED | OUTPATIENT
Start: 2023-02-28 | End: 2023-03-01

## 2023-02-28 RX ORDER — DIPHENHYDRAMINE HCL 50 MG
25 CAPSULE ORAL EVERY 6 HOURS
Refills: 0 | Status: DISCONTINUED | OUTPATIENT
Start: 2023-02-28 | End: 2023-03-02

## 2023-02-28 RX ORDER — NALBUPHINE HYDROCHLORIDE 10 MG/ML
2.5 INJECTION, SOLUTION INTRAMUSCULAR; INTRAVENOUS; SUBCUTANEOUS EVERY 6 HOURS
Refills: 0 | Status: DISCONTINUED | OUTPATIENT
Start: 2023-02-28 | End: 2023-03-01

## 2023-02-28 RX ORDER — SODIUM CHLORIDE 9 MG/ML
1000 INJECTION, SOLUTION INTRAVENOUS ONCE
Refills: 0 | Status: DISCONTINUED | OUTPATIENT
Start: 2023-02-28 | End: 2023-02-28

## 2023-02-28 RX ORDER — DEXAMETHASONE 0.5 MG/5ML
4 ELIXIR ORAL EVERY 6 HOURS
Refills: 0 | Status: DISCONTINUED | OUTPATIENT
Start: 2023-02-28 | End: 2023-03-01

## 2023-02-28 RX ORDER — IBUPROFEN 200 MG
600 TABLET ORAL EVERY 6 HOURS
Refills: 0 | Status: COMPLETED | OUTPATIENT
Start: 2023-02-28 | End: 2024-01-27

## 2023-02-28 RX ORDER — OXYTOCIN 10 UNIT/ML
333.33 VIAL (ML) INJECTION
Qty: 20 | Refills: 0 | Status: DISCONTINUED | OUTPATIENT
Start: 2023-02-28 | End: 2023-03-02

## 2023-02-28 RX ORDER — SIMETHICONE 80 MG/1
80 TABLET, CHEWABLE ORAL EVERY 4 HOURS
Refills: 0 | Status: DISCONTINUED | OUTPATIENT
Start: 2023-02-28 | End: 2023-03-02

## 2023-02-28 RX ORDER — ACETAMINOPHEN 500 MG
1000 TABLET ORAL ONCE
Refills: 0 | Status: COMPLETED | OUTPATIENT
Start: 2023-02-28 | End: 2023-02-28

## 2023-02-28 RX ORDER — TETANUS TOXOID, REDUCED DIPHTHERIA TOXOID AND ACELLULAR PERTUSSIS VACCINE, ADSORBED 5; 2.5; 8; 8; 2.5 [IU]/.5ML; [IU]/.5ML; UG/.5ML; UG/.5ML; UG/.5ML
0.5 SUSPENSION INTRAMUSCULAR ONCE
Refills: 0 | Status: DISCONTINUED | OUTPATIENT
Start: 2023-02-28 | End: 2023-03-02

## 2023-02-28 RX ORDER — OXYTOCIN 10 UNIT/ML
333.33 VIAL (ML) INJECTION
Qty: 20 | Refills: 0 | Status: DISCONTINUED | OUTPATIENT
Start: 2023-02-28 | End: 2023-02-28

## 2023-02-28 RX ORDER — ONDANSETRON 8 MG/1
4 TABLET, FILM COATED ORAL EVERY 6 HOURS
Refills: 0 | Status: DISCONTINUED | OUTPATIENT
Start: 2023-02-28 | End: 2023-03-01

## 2023-02-28 RX ORDER — OXYCODONE HYDROCHLORIDE 5 MG/1
5 TABLET ORAL
Refills: 0 | Status: DISCONTINUED | OUTPATIENT
Start: 2023-02-28 | End: 2023-03-01

## 2023-02-28 RX ORDER — SODIUM CHLORIDE 9 MG/ML
1000 INJECTION, SOLUTION INTRAVENOUS
Refills: 0 | Status: DISCONTINUED | OUTPATIENT
Start: 2023-02-28 | End: 2023-03-02

## 2023-02-28 RX ORDER — KETOROLAC TROMETHAMINE 30 MG/ML
30 SYRINGE (ML) INJECTION EVERY 6 HOURS
Refills: 0 | Status: DISCONTINUED | OUTPATIENT
Start: 2023-02-28 | End: 2023-03-01

## 2023-02-28 RX ORDER — NALOXONE HYDROCHLORIDE 4 MG/.1ML
0.1 SPRAY NASAL
Refills: 0 | Status: DISCONTINUED | OUTPATIENT
Start: 2023-02-28 | End: 2023-03-01

## 2023-02-28 RX ORDER — LANOLIN
1 OINTMENT (GRAM) TOPICAL EVERY 6 HOURS
Refills: 0 | Status: DISCONTINUED | OUTPATIENT
Start: 2023-02-28 | End: 2023-03-02

## 2023-02-28 RX ORDER — MORPHINE SULFATE 50 MG/1
0.1 CAPSULE, EXTENDED RELEASE ORAL ONCE
Refills: 0 | Status: DISCONTINUED | OUTPATIENT
Start: 2023-02-28 | End: 2023-03-01

## 2023-02-28 RX ORDER — SODIUM CHLORIDE 9 MG/ML
1000 INJECTION, SOLUTION INTRAVENOUS
Refills: 0 | Status: DISCONTINUED | OUTPATIENT
Start: 2023-02-28 | End: 2023-02-28

## 2023-02-28 RX ADMIN — Medication 30 MILLIGRAM(S): at 23:45

## 2023-02-28 RX ADMIN — FAMOTIDINE 20 MILLIGRAM(S): 10 INJECTION INTRAVENOUS at 08:01

## 2023-02-28 RX ADMIN — Medication 30 MILLIGRAM(S): at 18:11

## 2023-02-28 RX ADMIN — Medication 30 MILLIGRAM(S): at 18:43

## 2023-02-28 RX ADMIN — HEPARIN SODIUM 5000 UNIT(S): 5000 INJECTION INTRAVENOUS; SUBCUTANEOUS at 20:58

## 2023-02-28 RX ADMIN — Medication 30 MILLIGRAM(S): at 13:15

## 2023-02-28 RX ADMIN — Medication 400 MILLIGRAM(S): at 14:35

## 2023-02-28 RX ADMIN — Medication 30 MILLIGRAM(S): at 14:36

## 2023-02-28 RX ADMIN — Medication 15 MILLILITER(S): at 08:01

## 2023-02-28 RX ADMIN — Medication 975 MILLIGRAM(S): at 21:28

## 2023-02-28 NOTE — OB PROVIDER DELIVERY SUMMARY - NSSELHIDDEN_OBGYN_ALL_OB_FT
[NS_DeliveryAttending1_OBGYN_ALL_OB_FT:[id number]],[NS_DeliveryAssist1_OBGYN_ALL_OB_FT:[id number]],[NS_DeliveryRN_OBGYN_ALL_OB_FT:[id number]],[NS_DeliveryAttending2_OBGYN_ALL_OB_FT:[id number]=]

## 2023-02-28 NOTE — OB PROVIDER H&P - NS_OBGYNHISTORY_OBGYN_ALL_OB_FT
POBHx:  2016 C/S @ 39 6/7 wks. gest., CAT 2 with Oligohydramnios, 6#13    PGynHx:  Abn Pap with colposcopy for f/u PP

## 2023-02-28 NOTE — OB PROVIDER H&P - ASSESSMENT
32 y.o. B Female  EDC 40 4/7 wks. gest., H/O C/S, (+) GBS, who presents for Rpt. C/S.  Pt. had intent for TOLAC but labor did not ensue.  (+) FM.  (-) Ctx.  (-) Vaginal Bleeding/Fluid.  Pt. had Sciatica early in pregnancy which resolved.  PT. has been on Baby ASA during this pregnancy.  EFW 3600 gms.  23 Covid - neg.

## 2023-02-28 NOTE — OB PROVIDER H&P - NSICDXPASTMEDICALHX_GEN_ALL_CORE_FT
PAST MEDICAL HISTORY:  Currently pregnant     History of heartburn     History of herniated intervertebral disc     History of sciatica

## 2023-02-28 NOTE — OB PROVIDER H&P - NSPRENATALCARE_OBGYN_ALL_OB
Sent as FYI, Per [employer] PCP is Dr. GARRISON Velasco. Unable to reach patient - [facility] Navigation Care Manager has attempted outreach three times and are unable to reach the patient. Encounter closing.   Yes

## 2023-02-28 NOTE — OB RN DELIVERY SUMMARY - NS_SEPSISRSKCALC_OBGYN_ALL_OB_FT
EOS calculated successfully. EOS Risk Factor: 0.5/1000 live births ([facility] national incidence); GA=40w4d; Temp=98.42; ROM=0.017; GBS='Positive'; Antibiotics='No antibiotics or any antibiotics < 2 hrs prior to birth'

## 2023-02-28 NOTE — PRE-ANESTHESIA EVALUATION ADULT - NSANTHOSAYNRD_GEN_A_CORE
No. SHANKAR screening performed.  STOP BANG Legend: 0-2 = LOW Risk; 3-4 = INTERMEDIATE Risk; 5-8 = HIGH Risk

## 2023-02-28 NOTE — OB PROVIDER DELIVERY SUMMARY - NSPROVIDERDELIVERYNOTE_OBGYN_ALL_OB_FT
Viable Female Infant in Cephalic Presentation, Apgars 9-9.  Grossly normal Tubes & Ovaries.  Uterus with right lower segment sinus with hysterotomy made superiorly & Left lateral dense adhesion between uterus & abd wall that was easily lysed with the bovie.  Filmy adhesions between the bladder & lower uterine segment.  Hysterotomy closed in 2 layers & closure of left hysterotomy  extension to site of left adhesion performed in 3 layers.  Surgicel powder placed atop hysterotomy.   IVF:  1400 cc  QBL:  925 cc  UO:  200 cc Viable Female Infant in Cephalic Presentation, Apgars 9-9.  b/l tubes/ovaries not well visualized  Uterus with right lower segment sinus with hysterotomy made superiorly & lateral;   filmy adhesions on left lower segment/ adnexa  dense adhesion between uterus & ant abd wall (on left cornua) that was easily lysed with the bovie.  Filmy adhesions between the bladder & lower uterine segment.  Hysterotomy closed in 2 layers & closure of left hysterotomy  extension (J extension) closed in 3 layers.  Surgicel powder placed atop hysterotomy closure and over area where adhesion was taken down.   subQ closed with 2 layers of plain (running)  IVF:  1400 cc  QBL:  925 cc  UO:  200 cc

## 2023-02-28 NOTE — OB PROVIDER H&P - HISTORY OF PRESENT ILLNESS
32 y.o. B Female  EDC 40 4/7 wks. gest., H/O C/S, (+) GBS, who presents for Rpt. C/S.  Pt. had intent for TOLAC but labor did not ensue.  (+) FM.  (-) Ctx.  (-) Vaginal Bleeding/Fluid.  Pt. had Sciatica early in pregnancy which resolved.  PT. has been on Baby ASA during this pregnancy.  EFW 3600 gms.  23 Covid - neg.    POBHx:  2016 C/S @ 39 6/7 wks. gest., CAT 2 with Oligohydramnios, 6#13    PGynHx:  Abn Pap with colposcopy for f/u PP    PMHx:  L2 HNP Dx'ed on MRI  due to c/o pain, no tx, asx, had re-enforced with epidural with previous C/S             Sciatica during 1st trimester of this pregnancy, resolved    PSHx:  2016 C/S    Meds:  PNV 1 p.o. daily    NDA    SocHx:  Denies smoking tobacco/ETOH/Illegal drug use    FHx:  Denies

## 2023-02-28 NOTE — OB RN INTRAOPERATIVE NOTE - NSSELHIDDEN_OBGYN_ALL_OB_FT
[NS_DeliveryAttending1_OBGYN_ALL_OB_FT:[id number]],[NS_DeliveryAssist1_OBGYN_ALL_OB_FT:[id number]],[NS_DeliveryRN_OBGYN_ALL_OB_FT:[id number]] [NS_DeliveryAttending1_OBGYN_ALL_OB_FT:[id number]],[NS_DeliveryAssist1_OBGYN_ALL_OB_FT:[id number]],[NS_DeliveryRN_OBGYN_ALL_OB_FT:[id number]],[NS_DeliveryAttending2_OBGYN_ALL_OB_FT:[id number]=]

## 2023-02-28 NOTE — OB PROVIDER H&P - ATTENDING COMMENTS
agree w above  briefly, 33 yo  at 40+wks, h/o prior CD, for repeat CD. GBS POS  mt vss  reactive nst  reviewed plan of care. consents signed. nursing, anesthesia, peds aware  raymon silveira MD

## 2023-02-28 NOTE — OB RN DELIVERY SUMMARY - NSSELHIDDEN_OBGYN_ALL_OB_FT
[NS_DeliveryAttending1_OBGYN_ALL_OB_FT:[id number]],[NS_DeliveryAssist1_OBGYN_ALL_OB_FT:[id number]],[NS_DeliveryRN_OBGYN_ALL_OB_FT:[id number]] [NS_DeliveryAttending1_OBGYN_ALL_OB_FT:[id number]],[NS_DeliveryAssist1_OBGYN_ALL_OB_FT:[id number]],[NS_DeliveryRN_OBGYN_ALL_OB_FT:[id number]],[NS_DeliveryAttending2_OBGYN_ALL_OB_FT:[id number]=]

## 2023-02-28 NOTE — PRE-ANESTHESIA EVALUATION ADULT - NSANTHPMHFT_GEN_ALL_CORE
32 year old , presents for preop testing at 37w6d gestation for scheduled repeat  on 2023. Pt reports mild pop aggarwal contraction since week 36, no vaginal spotting or bleeding, feeling baby movement. Her medical history significant for L2 herniated disc, heartburn, sciatica. Reports back pain was early on in pregnancy and has resolved, never with radiculopathy. Heartburn is occasional and not requiring medication. Reports no issues with prior epidural anesthesia. No anticoagulants or bleeding hx.

## 2023-02-28 NOTE — OB RN PATIENT PROFILE - FUNCTIONAL ASSESSMENT - BASIC MOBILITY 6.
4-calculated by average/Not able to assess (calculate score using [facility] averaging method)

## 2023-02-28 NOTE — OB PROVIDER H&P - PROBLEM SELECTOR PLAN 1
Admit to L&D  NPO  IVF  BR  EFM/TOCO  Anesthesia consult  Routine labs  Rpt. C/S  UMA Amaya to BSD  Abd prep    TORIBIO Brown

## 2023-02-28 NOTE — PRE-ANESTHESIA EVALUATION ADULT - NSANTHVITALSIGNSFT_GEN_ALL_CORE
ICU Vital Signs Last 24 Hrs  T(C): 36.9 (28 Feb 2023 07:18), Max: 36.9 (28 Feb 2023 07:11)  T(F): 98.42 (28 Feb 2023 07:18), Max: 98.42 (28 Feb 2023 07:18)  HR: 89 (28 Feb 2023 07:33) (88 - 95)  BP: 119/71 (28 Feb 2023 07:18) (119/71 - 119/71)  BP(mean): --  ABP: --  ABP(mean): --  RR: 18 (28 Feb 2023 07:11) (18 - 18)  SpO2: 99% (28 Feb 2023 07:33) (99% - 99%)

## 2023-03-01 LAB
BASOPHILS # BLD AUTO: 0.06 K/UL — SIGNIFICANT CHANGE UP (ref 0–0.2)
BASOPHILS NFR BLD AUTO: 0.4 % — SIGNIFICANT CHANGE UP (ref 0–2)
EOSINOPHIL # BLD AUTO: 0.08 K/UL — SIGNIFICANT CHANGE UP (ref 0–0.5)
EOSINOPHIL NFR BLD AUTO: 0.6 % — SIGNIFICANT CHANGE UP (ref 0–6)
HCT VFR BLD CALC: 30.8 % — LOW (ref 34.5–45)
HGB BLD-MCNC: 10 G/DL — LOW (ref 11.5–15.5)
IMM GRANULOCYTES NFR BLD AUTO: 0.7 % — SIGNIFICANT CHANGE UP (ref 0–0.9)
LYMPHOCYTES # BLD AUTO: 15.3 % — SIGNIFICANT CHANGE UP (ref 13–44)
LYMPHOCYTES # BLD AUTO: 2.1 K/UL — SIGNIFICANT CHANGE UP (ref 1–3.3)
MCHC RBC-ENTMCNC: 29 PG — SIGNIFICANT CHANGE UP (ref 27–34)
MCHC RBC-ENTMCNC: 32.5 GM/DL — SIGNIFICANT CHANGE UP (ref 32–36)
MCV RBC AUTO: 89.3 FL — SIGNIFICANT CHANGE UP (ref 80–100)
MONOCYTES # BLD AUTO: 1.74 K/UL — HIGH (ref 0–0.9)
MONOCYTES NFR BLD AUTO: 12.7 % — SIGNIFICANT CHANGE UP (ref 2–14)
NEUTROPHILS # BLD AUTO: 9.65 K/UL — HIGH (ref 1.8–7.4)
NEUTROPHILS NFR BLD AUTO: 70.3 % — SIGNIFICANT CHANGE UP (ref 43–77)
NRBC # BLD: 0 /100 WBCS — SIGNIFICANT CHANGE UP (ref 0–0)
PLATELET # BLD AUTO: 220 K/UL — SIGNIFICANT CHANGE UP (ref 150–400)
RBC # BLD: 3.45 M/UL — LOW (ref 3.8–5.2)
RBC # FLD: 15.1 % — HIGH (ref 10.3–14.5)
WBC # BLD: 13.73 K/UL — HIGH (ref 3.8–10.5)
WBC # FLD AUTO: 13.73 K/UL — HIGH (ref 3.8–10.5)

## 2023-03-01 RX ORDER — IBUPROFEN 200 MG
600 TABLET ORAL EVERY 6 HOURS
Refills: 0 | Status: DISCONTINUED | OUTPATIENT
Start: 2023-03-01 | End: 2023-03-02

## 2023-03-01 RX ADMIN — Medication 30 MILLIGRAM(S): at 05:42

## 2023-03-01 RX ADMIN — Medication 975 MILLIGRAM(S): at 08:35

## 2023-03-01 RX ADMIN — Medication 975 MILLIGRAM(S): at 20:15

## 2023-03-01 RX ADMIN — HEPARIN SODIUM 5000 UNIT(S): 5000 INJECTION INTRAVENOUS; SUBCUTANEOUS at 08:35

## 2023-03-01 RX ADMIN — Medication 30 MILLIGRAM(S): at 06:12

## 2023-03-01 RX ADMIN — Medication 975 MILLIGRAM(S): at 03:14

## 2023-03-01 RX ADMIN — Medication 30 MILLIGRAM(S): at 00:18

## 2023-03-01 RX ADMIN — Medication 975 MILLIGRAM(S): at 14:26

## 2023-03-01 RX ADMIN — Medication 975 MILLIGRAM(S): at 09:35

## 2023-03-01 RX ADMIN — Medication 975 MILLIGRAM(S): at 02:44

## 2023-03-01 RX ADMIN — Medication 600 MILLIGRAM(S): at 12:25

## 2023-03-01 RX ADMIN — Medication 600 MILLIGRAM(S): at 18:06

## 2023-03-01 RX ADMIN — Medication 600 MILLIGRAM(S): at 11:25

## 2023-03-01 RX ADMIN — Medication 600 MILLIGRAM(S): at 17:06

## 2023-03-01 RX ADMIN — Medication 975 MILLIGRAM(S): at 21:00

## 2023-03-01 RX ADMIN — HEPARIN SODIUM 5000 UNIT(S): 5000 INJECTION INTRAVENOUS; SUBCUTANEOUS at 20:16

## 2023-03-01 RX ADMIN — Medication 975 MILLIGRAM(S): at 15:26

## 2023-03-01 RX ADMIN — Medication 600 MILLIGRAM(S): at 23:10

## 2023-03-01 NOTE — PROGRESS NOTE ADULT - ASSESSMENT
A/P: 31yo POD#1 s/p scheduled rLTCS with J extension of hysterotomy, . Patient is stable and doing well post-operatively.    - Continue regular diet.  - Increase ambulation.  - HSQ, venodynes for DVT prophylaxis  - Continue motrin, tylenol, oxycodone PRN for pain control  - F/u AM CBC    Gerda Palacio, PGY1 A/P: 31yo POD#1 s/p scheduled rLTCS with J extension of hysterotomy, . Patient is stable and doing well post-operatively.    - Continue regular diet.  - Increase ambulation.  - HSQ, venodynes for DVT prophylaxis  - Continue motrin, tylenol, oxycodone PRN for pain control  - F/u AM CBC  - DTV 8hrs after woodward catheter removal    Gerda Palacio, PGY1 A/P: 33yo POD#1 s/p scheduled rLTCS with J extension of hysterotomy, . Patient is stable and doing well post-operatively.    - Continue regular diet.  - Increase ambulation.  - HSQ, venodynes for DVT prophylaxis  - Continue motrin, tylenol, oxycodone PRN for pain control  - Incision dressing removed 3/1  - F/u AM CBC  - DTV 8hrs after woodward catheter removal    Gerda Palacio, PGY1

## 2023-03-01 NOTE — PROGRESS NOTE ADULT - SUBJECTIVE AND OBJECTIVE BOX
Day 1 of Anesthesia Pain Management Service    SUBJECTIVE: Doing ok  Pain Scale Score:          [X] Refer to charted pain scores    THERAPY:    s/p    100 mcg PF morphine on 2\28\2023       MEDICATIONS  (STANDING):  acetaminophen     Tablet .. 975 milliGRAM(s) Oral <User Schedule>  chlorhexidine 2% Cloths 1 Application(s) Topical once  diphtheria/tetanus/pertussis (acellular) Vaccine (Adacel) 0.5 milliLiter(s) IntraMuscular once  heparin   Injectable 5000 Unit(s) SubCutaneous every 12 hours  ibuprofen  Tablet. 600 milliGRAM(s) Oral every 6 hours  lactated ringers. 1000 milliLiter(s) (125 mL/Hr) IV Continuous <Continuous>  morphine PF Spinal 0.1 milliGRAM(s) IntraThecal. once  oxytocin Infusion 333.333 milliUNIT(s)/Min (1000 mL/Hr) IV Continuous <Continuous>    MEDICATIONS  (PRN):  dexAMETHasone  Injectable 4 milliGRAM(s) IV Push every 6 hours PRN Nausea  diphenhydrAMINE 25 milliGRAM(s) Oral every 6 hours PRN Pruritus  lanolin Ointment 1 Application(s) Topical every 6 hours PRN Sore Nipples  magnesium hydroxide Suspension 30 milliLiter(s) Oral two times a day PRN Constipation  nalbuphine Injectable 2.5 milliGRAM(s) IV Push every 6 hours PRN Pruritus  naloxone Injectable 0.1 milliGRAM(s) IV Push every 3 minutes PRN For ANY of the following changes in patient status:  A. Breaths Per Minute LESS THAN 10, B. Oxygen saturation LESS THAN 90%, C. Sedation score of 6 for Stop After: 4 Times  ondansetron Injectable 4 milliGRAM(s) IV Push every 6 hours PRN Nausea  oxyCODONE    IR 5 milliGRAM(s) Oral every 3 hours PRN Mild Pain (1 - 3)  oxyCODONE    IR 10 milliGRAM(s) Oral every 3 hours PRN Moderate Pain (4 - 6)  oxyCODONE    IR 5 milliGRAM(s) Oral every 3 hours PRN Moderate to Severe Pain (4-10)  oxyCODONE    IR 5 milliGRAM(s) Oral once PRN Moderate to Severe Pain (4-10)  simethicone 80 milliGRAM(s) Chew every 4 hours PRN Gas      OBJECTIVE:    Sedation:        	[X] Alert	 [ ] Drowsy	[ ] Arousable      [ ] Asleep       [ ] Unresponsive    Side Effects:	[X] None 	[ ] Nausea	[ ] Vomiting         [ ] Pruritus  		[ ] Weakness            [ ] Numbness	          [ ] Other:    Vital Signs Last 24 Hrs  T(C): 37 (01 Mar 2023 09:00), Max: 37.2 (01 Mar 2023 01:00)  T(F): 98.6 (01 Mar 2023 09:00), Max: 98.9 (01 Mar 2023 01:00)  HR: 90 (01 Mar 2023 09:00) (68 - 98)  BP: 99/64 (01 Mar 2023 09:00) (96/54 - 130/84)  BP(mean): 80 (28 Feb 2023 15:50) (71 - 100)  RR: 18 (01 Mar 2023 09:00) (16 - 26)  SpO2: 97% (01 Mar 2023 09:00) (96% - 100%)    Parameters below as of 01 Mar 2023 09:00  Patient On (Oxygen Delivery Method): room air        ASSESSMENT/ PLAN  [X] Patient transitioned to prn analgesics  [X] Pain management per primary service, pain service to sign off   [X]Documentation and Verification of current medications

## 2023-03-01 NOTE — PROGRESS NOTE ADULT - SUBJECTIVE AND OBJECTIVE BOX
OB Progress Note:  Delivery, POD#1    S: 31yo POD#1 s/p scheduled rLTCS with J extension of hysterotomy, . Her pain is well controlled. She is tolerating a regular diet and passing flatus. Denies N/V. Denies CP/SOB/lightheadedness/dizziness. She is ambulating without difficulty. Voiding spontaneously.     O:   Vital Signs Last 24 Hrs  T(C): 37.2 (01 Mar 2023 01:00), Max: 37.2 (01 Mar 2023 01:00)  T(F): 98.9 (01 Mar 2023 01:00), Max: 98.9 (01 Mar 2023 01:00)  HR: 98 (01 Mar 2023 01:00) (68 - 98)  BP: 112/71 (01 Mar 2023 01:00) (96/54 - 130/84)  BP(mean): 80 (2023 15:50) (71 - 100)  RR: 18 (01 Mar 2023 01:00) (16 - 26)  SpO2: 96% (01 Mar 2023 01:00) (96% - 100%)    Parameters below as of 01 Mar 2023 01:00  Patient On (Oxygen Delivery Method): room air    Labs:  Blood type: A Positive  Rubella IgG: RPR: Negative                          11.9   19.67<H> >-----------< 247    (  @ 15:14 )             36.1                        11.8   10.05 >-----------< 264    (  @ 07:51 )             36.5      PE:  General: NAD  Abdomen: Mildly distended, appropriately tender, incision c/d/i.  Extremities: No erythema, no pitting edema OB Progress Note:  Delivery, POD#1    S: 31yo POD#1 s/p scheduled rLTCS with J extension of hysterotomy, . Her pain is well controlled. She is tolerating a regular diet and passing flatus. Denies N/V. Denies CP/SOB/lightheadedness/dizziness. She is ambulating without difficulty. Has not voided spontaneously since woodward catheter removal.     O:   Vital Signs Last 24 Hrs  T(C): 37.2 (01 Mar 2023 01:00), Max: 37.2 (01 Mar 2023 01:00)  T(F): 98.9 (01 Mar 2023 01:00), Max: 98.9 (01 Mar 2023 01:00)  HR: 98 (01 Mar 2023 01:) (68 - 98)  BP: 112/71 (01 Mar 2023 01:00) (96/54 - 130/84)  BP(mean): 80 (2023 15:50) (71 - 100)  RR: 18 (01 Mar 2023 01:) (16 - 26)  SpO2: 96% (01 Mar 2023 01:00) (96% - 100%)    Parameters below as of 01 Mar 2023 01:00  Patient On (Oxygen Delivery Method): room air    Labs:  Blood type: A Positive  Rubella IgG: RPR: Negative                          11.9   19.67<H> >-----------< 247    (  @ 15:14 )             36.1                        11.8   10.05 >-----------< 264    (  @ 07:51 )             36.5      PE:  General: NAD  Abdomen: Mildly distended, appropriately tender, incision c/d/i.  Extremities: No erythema, no pitting edema OB Progress Note:  Delivery, POD#1    S: 33yo POD#1 s/p scheduled rLTCS with J extension of hysterotomy, . Her pain is well controlled. She is tolerating a regular diet and passing flatus. Denies N/V. Denies CP/SOB/lightheadedness/dizziness. She is ambulating without difficulty. Has not voided spontaneously since woodward catheter removal.     O:   Vital Signs Last 24 Hrs  T(C): 37.2 (01 Mar 2023 01:00), Max: 37.2 (01 Mar 2023 01:00)  T(F): 98.9 (01 Mar 2023 01:00), Max: 98.9 (01 Mar 2023 01:00)  HR: 98 (01 Mar 2023 01:) (68 - 98)  BP: 112/71 (01 Mar 2023 01:00) (96/54 - 130/84)  BP(mean): 80 (2023 15:50) (71 - 100)  RR: 18 (01 Mar 2023 01:) (16 - 26)  SpO2: 96% (01 Mar 2023 01:00) (96% - 100%)    Parameters below as of 01 Mar 2023 01:00  Patient On (Oxygen Delivery Method): room air    Labs:  Blood type: A Positive  Rubella IgG: RPR: Negative                          11.9   19.67<H> >-----------< 247    (  @ 15:14 )             36.1                        11.8   10.05 >-----------< 264    (  @ 07:51 )             36.5      PE:  General: NAD  Abdomen: Mildly distended, appropriately tender, incision c/d/i with sterri strips in place  Extremities: No erythema, no pitting edema

## 2023-03-02 ENCOUNTER — TRANSCRIPTION ENCOUNTER (OUTPATIENT)
Age: 33
End: 2023-03-02

## 2023-03-02 VITALS
DIASTOLIC BLOOD PRESSURE: 69 MMHG | HEART RATE: 86 BPM | OXYGEN SATURATION: 99 % | SYSTOLIC BLOOD PRESSURE: 105 MMHG | RESPIRATION RATE: 18 BRPM | TEMPERATURE: 97 F

## 2023-03-02 PROCEDURE — 86900 BLOOD TYPING SEROLOGIC ABO: CPT

## 2023-03-02 PROCEDURE — 59025 FETAL NON-STRESS TEST: CPT

## 2023-03-02 PROCEDURE — 59050 FETAL MONITOR W/REPORT: CPT

## 2023-03-02 PROCEDURE — 86769 SARS-COV-2 COVID-19 ANTIBODY: CPT

## 2023-03-02 PROCEDURE — 86901 BLOOD TYPING SEROLOGIC RH(D): CPT

## 2023-03-02 PROCEDURE — 85025 COMPLETE CBC W/AUTO DIFF WBC: CPT

## 2023-03-02 PROCEDURE — C1889: CPT

## 2023-03-02 PROCEDURE — 86780 TREPONEMA PALLIDUM: CPT

## 2023-03-02 PROCEDURE — 85027 COMPLETE CBC AUTOMATED: CPT

## 2023-03-02 PROCEDURE — 86850 RBC ANTIBODY SCREEN: CPT

## 2023-03-02 RX ORDER — OXYCODONE HYDROCHLORIDE 5 MG/1
1 TABLET ORAL
Qty: 12 | Refills: 0
Start: 2023-03-02 | End: 2023-03-04

## 2023-03-02 RX ORDER — SIMETHICONE 80 MG/1
1 TABLET, CHEWABLE ORAL
Qty: 0 | Refills: 0 | DISCHARGE
Start: 2023-03-02

## 2023-03-02 RX ORDER — IBUPROFEN 200 MG
1 TABLET ORAL
Qty: 0 | Refills: 0 | DISCHARGE
Start: 2023-03-02

## 2023-03-02 RX ADMIN — Medication 975 MILLIGRAM(S): at 03:02

## 2023-03-02 RX ADMIN — Medication 975 MILLIGRAM(S): at 03:45

## 2023-03-02 RX ADMIN — Medication 600 MILLIGRAM(S): at 12:20

## 2023-03-02 RX ADMIN — Medication 600 MILLIGRAM(S): at 05:46

## 2023-03-02 RX ADMIN — Medication 975 MILLIGRAM(S): at 10:00

## 2023-03-02 RX ADMIN — HEPARIN SODIUM 5000 UNIT(S): 5000 INJECTION INTRAVENOUS; SUBCUTANEOUS at 09:03

## 2023-03-02 RX ADMIN — Medication 600 MILLIGRAM(S): at 00:00

## 2023-03-02 RX ADMIN — Medication 975 MILLIGRAM(S): at 09:03

## 2023-03-02 RX ADMIN — Medication 600 MILLIGRAM(S): at 11:20

## 2023-03-02 RX ADMIN — Medication 600 MILLIGRAM(S): at 06:59

## 2023-03-02 NOTE — DISCHARGE NOTE OB - HAS THE PATIENT USED TOBACCO IN THE PAST 30 DAYS?
[facility]  [address] [facility] [address]  [phone]  Dept: [phone]    PRE-OP EVALUATION:  Chief Complaint   Patient presents with     Pre-Op Exam     DOS 18 [facility], right ankle repair      Today's date: 2018    Joanie Coe (: 1963) presents for pre-operative evaluation assessment as requested by Dr. Javy Gross.  She requires evaluation and anesthesia risk assessment prior to undergoing surgery/procedure for treatment of lateral ligament issue .    Proposed Surgery/ Procedure: ankle scope, debridement lateral ligament  Date of Surgery/ Procedure: 18  Time of Surgery/ Procedure: unknown   Hospital/Surgical Facility: [facility]     Primary Physician: Kalyani Edward   Type of Anesthesia Anticipated: to be determined    Patient has a Health Care Directive or Living Will:  NO, patient requested information today, printed and given.     1. NO - Do you have a history of heart attack, stroke, stent, bypass or surgery on an artery in the head, neck, heart or legs?  2. NO - Do you ever have any pain or discomfort in your chest?  3. NO - Do you have a history of  Heart Failure?  4. NO - Are you troubled by shortness of breath when: walking on the level, up a slight hill or at night?  5. NO - Do you currently have a cold, bronchitis or other respiratory infection?  6. NO - Do you have a cough, shortness of breath or wheezing?  7. NO - Do you sometimes get pains in the calves of your legs when you walk?  8. NO - Do you or anyone in your family have previous history of blood clots?  9. NO - Do you or does anyone in your family have a serious bleeding problem such as prolonged bleeding following surgeries or cuts?  10. NO - Have you ever had problems with anemia or been told to take iron pills?  11. NO - Have you had any abnormal blood loss such as black, tarry or bloody stools, or abnormal vaginal bleeding?  12. NO - Have you ever had a  blood transfusion?  13. NO - Have you or any of your relatives ever had problems with anesthesia?  14. NO - Do you have sleep apnea, excessive snoring or daytime drowsiness?  15. NO - Do you have any prosthetic heart valves?  16. NO - Do you have prosthetic joints?  17. NO - Is there any chance that you may be pregnant?      HPI:     HPI related to upcoming procedure: Started having right lateral ankle pain in April after doing a lot of rock climbing.  She has been using a brace but pain continues and she reports that podiatry found that some ligaments were  from the bone so surgical repair is planned.  She is otherwise healthy and feeling well.         MEDICAL HISTORY:     Patient Active Problem List    Diagnosis Date Noted     Cervical polyp 11/20/2017     Priority: Medium     Removed in 2008; recurrent 2017  LEEP removal performed 2/12/2018         CARDIOVASCULAR SCREENING; LDL GOAL LESS THAN 160 10/31/2010     Priority: Medium      Past Medical History:   Diagnosis Date     NO ACTIVE PROBLEMS      Past Surgical History:   Procedure Laterality Date     COLONOSCOPY  3/28/2014    Procedure: COLONOSCOPY;  Colonoscopy;  Surgeon: Everett Gray MD;  Location: [facility]     HC TOOTH EXTRACTION W/FORCEP      root canal     PELVIS LAPAROSCOPY,DX       Current Outpatient Prescriptions   Medication Sig Dispense Refill     CLARITIN 10 MG OR TABS 1 TABLET DAILY       BIOTIN        Nutritional Supplements (ESTROVEN PO)        Omega-3 Fatty Acids (FISH OIL PO)        Probiotic Product (PROBIOTIC DAILY PO)        Psyllium (METAMUCIL FREE & NATURAL PO)        OTC products: NSAIDS    Allergies   Allergen Reactions     Nka [No Known Allergies]       Latex Allergy: NO    Social History   Substance Use Topics     Smoking status: Never Smoker     Smokeless tobacco: Never Used     Alcohol use Yes      Comment: occ     History   Drug Use No       REVIEW OF SYSTEMS:   Constitutional, neuro, ENT, endocrine, pulmonary,  "cardiac, gastrointestinal, genitourinary, musculoskeletal, integument and psychiatric systems are negative, except as otherwise noted.    EXAM:   /72 (BP Location: Right arm, Patient Position: Chair, Cuff Size: Adult Regular)  Pulse 72  Temp 98.4  F (36.9  C) (Tympanic)  Resp 14  Ht 5' 10\" (1.778 m)  Wt 164 lb 9.6 oz (74.7 kg)  SpO2 98%  BMI 23.62 kg/m2      GENERAL APPEARANCE: healthy, alert and no distress     HENT: normal ear canals and TMs, nose and mouth without ulcers or lesions     NECK: no adenopathy, no asymmetry, masses, or scars     RESP: lungs clear to auscultation - no rales, rhonchi or wheezes     CV: regular rates and rhythm, normal S1 S2, no S3 or S4 and no murmur, click or rub -     ABDOMEN:  soft, nontender, no HSM or masses and bowel sounds normal     MS: right ankle in brace, otherwise no gross deformities noted       NEURO: mentation intact and speech normal     PSYCH: mentation appears normal. and affect normal/bright     LYMPHATICS: No cervical or supraclavicular nodes     DIAGNOSTICS:   No labs or EKG required for low risk surgery (cataract, skin procedure, breast biopsy, etc)    No results for input(s): HGB, PLT, INR, NA, POTASSIUM, CR, A1C in the last 09712 hours.     IMPRESSION:   Reason for surgery/procedure: Right ankle scope with ligament repair  Diagnosis/reason for consult: Pre-op eval    The proposed surgical procedure is considered LOW risk.    REVISED CARDIAC RISK INDEX  The patient has the following serious cardiovascular risks for perioperative complications such as (MI, PE, VFib and 3  AV Block):  No serious cardiac risks  INTERPRETATION: 0 risks: Class I (very low risk - 0.4% complication rate)    The patient has the following additional risks for perioperative complications:  No identified additional risks      ICD-10-CM    1. Preop general physical exam Z01.818    2. Chronic pain of right ankle M25.571     G89.29        RECOMMENDATIONS:       --Patient is to take " all scheduled medications on the day of surgery EXCEPT for modifications listed below.    Anticoagulant or Antiplatelet Medication Use  NSAIDS: Ibuprofen (Motrin):         Stop one day prior to surgery        APPROVAL GIVEN to proceed with proposed procedure, without further diagnostic evaluation       Signed Electronically by: Farhan Coyne MD    Copy of this evaluation report is provided to requesting physician.    [place] Preop Guidelines    Revised Cardiac Risk Index   No

## 2023-03-02 NOTE — DISCHARGE NOTE OB - PATIENT PORTAL LINK FT
You can access the FollowMyHealth Patient Portal offered by [facility] by registering at the following website: [email or web address]. By joining [employer]’s FollowMyHealth portal, you will also be able to view your health information using other applications (apps) compatible with our system.

## 2023-03-02 NOTE — DISCHARGE NOTE OB - CARE PROVIDER_API CALL
Winifred Nelson  Obstetrics and Gynecology  [address] [place] [address], [address]  [place] [address]  Phone: [phone]  Fax: [phone]  Follow Up Time:

## 2023-03-02 NOTE — PROGRESS NOTE ADULT - ATTENDING COMMENTS
Patient seen and examined. Agree with above.
pt seen and examined. overall doing well. am hgb 10 and pt denies sx of anemia. routine postop care. ambulation encouraged.     daisy enamorado

## 2023-03-02 NOTE — DISCHARGE NOTE OB - PLAN OF CARE
Follow up in the office in 2 weeks. Call to schedule an appointment. Monitor blood pressures. Call for blood pressures above 140/90 or persistent headache, visual changes or abdominal pain. Continue iron.

## 2023-03-02 NOTE — PROGRESS NOTE ADULT - SUBJECTIVE AND OBJECTIVE BOX
OB Progress Note:  Delivery, POD#2    S: 33yo POD#2 s/p scheduled rLTCS with J extension of hysterotomy, . Her pain is well controlled. She is tolerating a regular diet and passing flatus. Denies N/V. Denies CP/SOB/lightheadedness/dizziness. She is ambulating without difficulty. Has not voided spontaneously since woodward catheter removal.     O:   Vital Signs Last 24 Hrs  T(C): 36.9 (02 Mar 2023 05:24), Max: 37.1 (01 Mar 2023 13:00)  T(F): 98.4 (02 Mar 2023 05:24), Max: 98.7 (01 Mar 2023 13:00)  HR: 83 (02 Mar 2023 05:24) (83 - 101)  BP: 104/65 (02 Mar 2023 05:24) (99/64 - 114/73)  RR: 18 (02 Mar 2023 05:24) (18 - 18)  SpO2: 98% (02 Mar 2023 05:24) (97% - 99%)    Parameters below as of 02 Mar 2023 05:24  Patient On (Oxygen Delivery Method): room air    Labs:  Blood type: A Positive  Rubella IgG: RPR: Negative                          11.9   19.67<H> >-----------< 247    (  @ 15:14 )             36.1                        11.8   10.05 >-----------< 264    (  @ 07:51 )             36.5      PE:  General: NAD  Abdomen: Mildly distended, appropriately tender, incision c/d/i with sterri strips in place  Extremities: No erythema, no pitting edema OB Progress Note:  Delivery, POD#2    S: 33yo POD#2 s/p scheduled rLTCS with J extension of hysterotomy, . Her pain is well controlled. She is tolerating a regular diet and passing flatus. Denies N/V. Denies CP/SOB/lightheadedness/dizziness. She is ambulating without difficulty. Voiding spontaneously.    O:   Vital Signs Last 24 Hrs  T(C): 36.9 (02 Mar 2023 05:24), Max: 37.1 (01 Mar 2023 13:00)  T(F): 98.4 (02 Mar 2023 05:24), Max: 98.7 (01 Mar 2023 13:00)  HR: 83 (02 Mar 2023 05:24) (83 - 101)  BP: 104/65 (02 Mar 2023 05:24) (99/64 - 114/73)  RR: 18 (02 Mar 2023 05:24) (18 - 18)  SpO2: 98% (02 Mar 2023 05:24) (97% - 99%)    Parameters below as of 02 Mar 2023 05:24  Patient On (Oxygen Delivery Method): room air    Labs:  Blood type: A Positive  Rubella IgG: RPR: Negative                          11.9   19.67<H> >-----------< 247    (  @ 15:14 )             36.1                        11.8   10.05 >-----------< 264    (  @ 07:51 )             36.5      PE:  General: NAD  Abdomen: Mildly distended, appropriately tender, incision c/d/i with sterri strips in place  Extremities: No erythema, no pitting edema

## 2023-03-02 NOTE — DISCHARGE NOTE OB - CARE PLAN
[General Appearance - Well Developed] : well developed [General Appearance - Well Nourished] : well nourished [Normal Appearance] : normal appearance [Well Groomed] : well groomed [General Appearance - In No Acute Distress] : no acute distress [No Deformities] : no deformities [Normal Conjunctiva] : the conjunctiva exhibited no abnormalities [III] : III [Eyelids - No Xanthelasma] : the eyelids demonstrated no xanthelasmas [Normal Oropharynx] : normal oropharynx [Neck Cervical Mass (___cm)] : no neck mass was observed [Jugular Venous Distention Increased] : there was no jugular-venous distention [Neck Appearance] : the appearance of the neck was normal [Thyroid Diffuse Enlargement] : the thyroid was not enlarged [Heart Rate And Rhythm] : heart rate and rhythm were normal [Thyroid Nodule] : there were no palpable thyroid nodules [Heart Sounds] : normal S1 and S2 [Murmurs] : no murmurs present [Exaggerated Use Of Accessory Muscles For Inspiration] : no accessory muscle use [Respiration, Rhythm And Depth] : normal respiratory rhythm and effort [Auscultation Breath Sounds / Voice Sounds] : lungs were clear to auscultation bilaterally [Abdomen Soft] : soft [Abdomen Mass (___ Cm)] : no abdominal mass palpated [Abdomen Tenderness] : non-tender [Abnormal Walk] : normal gait [Nail Clubbing] : no clubbing of the fingernails [Gait - Sufficient For Exercise Testing] : the gait was sufficient for exercise testing [Cyanosis, Localized] : no localized cyanosis [Petechial Hemorrhages (___cm)] : no petechial hemorrhages [No Venous Stasis] : no venous stasis [No Xanthoma] : no  xanthoma was observed [Skin Lesions] : no skin lesions [No Skin Ulcers] : no skin ulcer [No Focal Deficits] : no focal deficits [Deep Tendon Reflexes (DTR)] : deep tendon reflexes were 2+ and symmetric Principal Discharge DX:	 delivery delivered  Assessment and plan of treatment:	Follow up in the office in 2 weeks. Call to schedule an appointment. Monitor blood pressures. Call for blood pressures above 140/90 or persistent headache, visual changes or abdominal pain.  Secondary Diagnosis:	Anemia due to acute blood loss  Assessment and plan of treatment:	Continue iron.   [Sensation] : the sensory exam was normal to light touch and pinprick [Oriented To Time, Place, And Person] : oriented to person, place, and time [Affect] : the affect was normal [Impaired Insight] : insight and judgment were intact [Skin Color & Pigmentation] : normal skin color and pigmentation [] : no rash [Skin Turgor] : normal skin turgor [FreeTextEntry1] : I:E 1:3, clear  1

## 2023-03-02 NOTE — DISCHARGE NOTE OB - MEDICATION SUMMARY - MEDICATIONS TO TAKE
I will START or STAY ON the medications listed below when I get home from the hospital:    ibuprofen 600 mg oral tablet  -- 1 tab(s) by mouth every 6 hours  -- Indication: For pain    aspirin 81 mg oral tablet  -- morning   -- Indication: For prevention    Slow Fe (as elemental iron) 45 mg oral tablet, extended release  -- 1 tab(s) by mouth once a day  -- Indication: For anemia    Prena1 oral capsule  -- 1 cap(s) by mouth once a day  -- Indication: For nutritional supplement    simethicone 80 mg oral tablet, chewable  -- 1 tab(s) by mouth every 4 hours, As needed, Gas  -- Indication: For gas pain

## 2023-03-02 NOTE — DISCHARGE NOTE OB - HOSPITAL COURSE
Pt admitted at 40 weeks for scheduled repeat  delivery. Pt had uncomplicated  delivery. Postoperatively she had anemia and was started on iron. On postoperative day 2 she met criteria for discharge home. Pt to follow up in the office in 2 weeks.

## 2023-03-02 NOTE — DISCHARGE NOTE OB - NS MD DC FALL RISK RISK
For information on Fall & Injury Prevention, visit: [email or web address] OR  [email or web address] OR  https://www.cdc.gov/steadi/patient.html

## 2023-03-02 NOTE — PROGRESS NOTE ADULT - ASSESSMENT
A/P: 31yo POD#2 s/p scheduled rLTCS with J extension of hysterotomy, . Patient is stable and doing well post-operatively.    - Continue regular diet.  - Increase ambulation.  - HSQ, venodynes for DVT prophylaxis  - Continue motrin, tylenol, oxycodone PRN for pain control  - Incision dressing removed 3/1  - Hct 36.1->30.8    Gerda Palacio, PGY1

## 2023-03-06 ENCOUNTER — NON-APPOINTMENT (OUTPATIENT)
Age: 33
End: 2023-03-06

## 2023-03-07 DIAGNOSIS — M79.89 OTHER SPECIFIED SOFT TISSUE DISORDERS: ICD-10-CM

## 2023-03-08 ENCOUNTER — EMERGENCY (EMERGENCY)
Facility: HOSPITAL | Age: 33
LOS: 1 days | Discharge: HOME | End: 2023-03-08
Attending: EMERGENCY MEDICINE
Payer: COMMERCIAL

## 2023-03-08 VITALS
SYSTOLIC BLOOD PRESSURE: 113 MMHG | DIASTOLIC BLOOD PRESSURE: 79 MMHG | RESPIRATION RATE: 20 BRPM | OXYGEN SATURATION: 100 % | HEART RATE: 68 BPM

## 2023-03-08 VITALS
HEART RATE: 90 BPM | DIASTOLIC BLOOD PRESSURE: 83 MMHG | RESPIRATION RATE: 20 BRPM | OXYGEN SATURATION: 99 % | HEIGHT: 67 IN | TEMPERATURE: 99 F | WEIGHT: 220.02 LBS | SYSTOLIC BLOOD PRESSURE: 130 MMHG

## 2023-03-08 DIAGNOSIS — Z98.891 HISTORY OF UTERINE SCAR FROM PREVIOUS SURGERY: Chronic | ICD-10-CM

## 2023-03-08 LAB
ALBUMIN SERPL ELPH-MCNC: 3.6 G/DL — SIGNIFICANT CHANGE UP (ref 3.3–5)
ALP SERPL-CCNC: 69 U/L — SIGNIFICANT CHANGE UP (ref 40–120)
ALT FLD-CCNC: 44 U/L — SIGNIFICANT CHANGE UP (ref 10–45)
ANION GAP SERPL CALC-SCNC: 12 MMOL/L — SIGNIFICANT CHANGE UP (ref 5–17)
AST SERPL-CCNC: 19 U/L — SIGNIFICANT CHANGE UP (ref 10–40)
BASOPHILS # BLD AUTO: 0.06 K/UL — SIGNIFICANT CHANGE UP (ref 0–0.2)
BASOPHILS NFR BLD AUTO: 0.6 % — SIGNIFICANT CHANGE UP (ref 0–2)
BILIRUB SERPL-MCNC: 0.3 MG/DL — SIGNIFICANT CHANGE UP (ref 0.2–1.2)
BUN SERPL-MCNC: 11 MG/DL — SIGNIFICANT CHANGE UP (ref 7–23)
CALCIUM SERPL-MCNC: 9.5 MG/DL — SIGNIFICANT CHANGE UP (ref 8.4–10.5)
CHLORIDE SERPL-SCNC: 104 MMOL/L — SIGNIFICANT CHANGE UP (ref 96–108)
CO2 SERPL-SCNC: 24 MMOL/L — SIGNIFICANT CHANGE UP (ref 22–31)
CREAT SERPL-MCNC: 0.77 MG/DL — SIGNIFICANT CHANGE UP (ref 0.5–1.3)
EGFR: 105 ML/MIN/1.73M2 — SIGNIFICANT CHANGE UP
EOSINOPHIL # BLD AUTO: 0.17 K/UL — SIGNIFICANT CHANGE UP (ref 0–0.5)
EOSINOPHIL NFR BLD AUTO: 1.6 % — SIGNIFICANT CHANGE UP (ref 0–6)
GLUCOSE SERPL-MCNC: 75 MG/DL — SIGNIFICANT CHANGE UP (ref 70–99)
HCT VFR BLD CALC: 37 % — SIGNIFICANT CHANGE UP (ref 34.5–45)
HGB BLD-MCNC: 11.7 G/DL — SIGNIFICANT CHANGE UP (ref 11.5–15.5)
IMM GRANULOCYTES NFR BLD AUTO: 0.7 % — SIGNIFICANT CHANGE UP (ref 0–0.9)
LDH SERPL L TO P-CCNC: 187 U/L — SIGNIFICANT CHANGE UP (ref 50–242)
LYMPHOCYTES # BLD AUTO: 1.54 K/UL — SIGNIFICANT CHANGE UP (ref 1–3.3)
LYMPHOCYTES # BLD AUTO: 14.4 % — SIGNIFICANT CHANGE UP (ref 13–44)
MAGNESIUM SERPL-MCNC: 1.8 MG/DL — SIGNIFICANT CHANGE UP (ref 1.6–2.6)
MCHC RBC-ENTMCNC: 28.4 PG — SIGNIFICANT CHANGE UP (ref 27–34)
MCHC RBC-ENTMCNC: 31.6 GM/DL — LOW (ref 32–36)
MCV RBC AUTO: 89.8 FL — SIGNIFICANT CHANGE UP (ref 80–100)
MONOCYTES # BLD AUTO: 1.23 K/UL — HIGH (ref 0–0.9)
MONOCYTES NFR BLD AUTO: 11.5 % — SIGNIFICANT CHANGE UP (ref 2–14)
NEUTROPHILS # BLD AUTO: 7.63 K/UL — HIGH (ref 1.8–7.4)
NEUTROPHILS NFR BLD AUTO: 71.2 % — SIGNIFICANT CHANGE UP (ref 43–77)
NRBC # BLD: 0 /100 WBCS — SIGNIFICANT CHANGE UP (ref 0–0)
NT-PROBNP SERPL-SCNC: 130 PG/ML — SIGNIFICANT CHANGE UP (ref 0–300)
PLATELET # BLD AUTO: 380 K/UL — SIGNIFICANT CHANGE UP (ref 150–400)
POTASSIUM SERPL-MCNC: 3.9 MMOL/L — SIGNIFICANT CHANGE UP (ref 3.5–5.3)
POTASSIUM SERPL-SCNC: 3.9 MMOL/L — SIGNIFICANT CHANGE UP (ref 3.5–5.3)
PROT SERPL-MCNC: 6.8 G/DL — SIGNIFICANT CHANGE UP (ref 6–8.3)
RBC # BLD: 4.12 M/UL — SIGNIFICANT CHANGE UP (ref 3.8–5.2)
RBC # FLD: 15 % — HIGH (ref 10.3–14.5)
SODIUM SERPL-SCNC: 140 MMOL/L — SIGNIFICANT CHANGE UP (ref 135–145)
URATE SERPL-MCNC: 5.2 MG/DL — SIGNIFICANT CHANGE UP (ref 2.5–7)
WBC # BLD: 10.7 K/UL — HIGH (ref 3.8–10.5)
WBC # FLD AUTO: 10.7 K/UL — HIGH (ref 3.8–10.5)

## 2023-03-08 PROCEDURE — 85014 HEMATOCRIT: CPT

## 2023-03-08 PROCEDURE — 82803 BLOOD GASES ANY COMBINATION: CPT

## 2023-03-08 PROCEDURE — 96374 THER/PROPH/DIAG INJ IV PUSH: CPT

## 2023-03-08 PROCEDURE — 83615 LACTATE (LD) (LDH) ENZYME: CPT

## 2023-03-08 PROCEDURE — 82947 ASSAY GLUCOSE BLOOD QUANT: CPT

## 2023-03-08 PROCEDURE — 99284 EMERGENCY DEPT VISIT MOD MDM: CPT | Mod: 25

## 2023-03-08 PROCEDURE — 85018 HEMOGLOBIN: CPT

## 2023-03-08 PROCEDURE — 84132 ASSAY OF SERUM POTASSIUM: CPT

## 2023-03-08 PROCEDURE — 99284 EMERGENCY DEPT VISIT MOD MDM: CPT

## 2023-03-08 PROCEDURE — 83605 ASSAY OF LACTIC ACID: CPT

## 2023-03-08 PROCEDURE — 93005 ELECTROCARDIOGRAM TRACING: CPT

## 2023-03-08 PROCEDURE — 82435 ASSAY OF BLOOD CHLORIDE: CPT

## 2023-03-08 PROCEDURE — 83880 ASSAY OF NATRIURETIC PEPTIDE: CPT

## 2023-03-08 PROCEDURE — 84295 ASSAY OF SERUM SODIUM: CPT

## 2023-03-08 PROCEDURE — 83735 ASSAY OF MAGNESIUM: CPT

## 2023-03-08 PROCEDURE — 82330 ASSAY OF CALCIUM: CPT

## 2023-03-08 PROCEDURE — 85025 COMPLETE CBC W/AUTO DIFF WBC: CPT

## 2023-03-08 PROCEDURE — 84550 ASSAY OF BLOOD/URIC ACID: CPT

## 2023-03-08 PROCEDURE — 80053 COMPREHEN METABOLIC PANEL: CPT

## 2023-03-08 PROCEDURE — 96375 TX/PRO/DX INJ NEW DRUG ADDON: CPT

## 2023-03-08 RX ORDER — KETOROLAC TROMETHAMINE 30 MG/ML
15 SYRINGE (ML) INJECTION ONCE
Refills: 0 | Status: DISCONTINUED | OUTPATIENT
Start: 2023-03-08 | End: 2023-03-08

## 2023-03-08 RX ORDER — ACETAMINOPHEN 500 MG
975 TABLET ORAL ONCE
Refills: 0 | Status: COMPLETED | OUTPATIENT
Start: 2023-03-08 | End: 2023-03-08

## 2023-03-08 RX ORDER — METOCLOPRAMIDE HCL 10 MG
10 TABLET ORAL ONCE
Refills: 0 | Status: COMPLETED | OUTPATIENT
Start: 2023-03-08 | End: 2023-03-08

## 2023-03-08 RX ORDER — DIPHENHYDRAMINE HCL 50 MG
25 CAPSULE ORAL ONCE
Refills: 0 | Status: COMPLETED | OUTPATIENT
Start: 2023-03-08 | End: 2023-03-08

## 2023-03-08 RX ADMIN — Medication 10 MILLIGRAM(S): at 12:22

## 2023-03-08 RX ADMIN — Medication 975 MILLIGRAM(S): at 12:23

## 2023-03-08 RX ADMIN — Medication 25 MILLIGRAM(S): at 12:22

## 2023-03-08 RX ADMIN — Medication 15 MILLIGRAM(S): at 13:13

## 2023-03-08 RX ADMIN — Medication 975 MILLIGRAM(S): at 13:17

## 2023-03-08 NOTE — ED PROVIDER NOTE - MUSCULOSKELETAL, MLM
Spine appears normal, range of motion is not limited, no muscle or joint tenderness. Symmetric nonpitting swelling to b/l ankles and feet, nontender, no overlying erythema. No calf asymmetry of pain.

## 2023-03-08 NOTE — ED ADULT NURSE REASSESSMENT NOTE - NS ED NURSE REASSESS COMMENT FT1
Patient states her pain was a 8/10 on arrival and now the headache is a 2/10. Pt was able to pump her breast milk, bottles are labeled and in patient fridge.

## 2023-03-08 NOTE — ED PROVIDER NOTE - ATTENDING APP SHARED VISIT CONTRIBUTION OF CARE
Dr. Butt (Attending Physician)  I performed a history and physical exam of the patient and discussed their management with the advanced care provider. I reviewed the advanced care provider's note and agree with the documented findings and plan of care. My medical decision making and objective findings are found above.

## 2023-03-08 NOTE — ED PROVIDER NOTE - NSFOLLOWUPINSTRUCTIONS_ED_ALL_ED_FT
Follow-up with your primary doctor in the next 2 to 3 days for reevaluation.    Additionally please follow-up with your OB/GYN Dr. Nelson next week as previously scheduled.    For headache you may take Tylenol 650 mg every 6 hours as needed.    For your leg swelling recommend keeping elevated throughout the day.    Return to the Emergency Department immediately if you develop any new/worsening symptoms including but not limited to speech/visual changes, worsening headache despite Tylenol usage, numbness/tingling, weakness, chest pain, shortness of breath or any other concerning symptoms.

## 2023-03-08 NOTE — ED PROVIDER NOTE - CHILD ABUSE FACILITY
[facility] Cyclophosphamide Pregnancy And Lactation Text: This medication is Pregnancy Category D and it isn't considered safe during pregnancy. This medication is excreted in breast milk.

## 2023-03-08 NOTE — ED PROVIDER NOTE - PROGRESS NOTE DETAILS
Made OB/GYN resident aware patient here as Dr. Nelson was aware she was coming in. They will discuss with Dr. Nelson regarding any recs on their end if warranted.  At this time we will treat for migraine, obtain labs and reassess while closely monitoring blood pressure, though this is normal at this time. - Mekhi Eli PA-C Patient feels much improved s/p meds. Headache 0 out of 10 currently.  OB/GYN aware the patient feels improved and that we are discharging patient home. Do not feel patient needs to be seen by OB/GYN at this time given no acute OB/GYN related pathology. No neurological deficits on exam at this time.  Patient to follow-up with her OB/GYN next week as previously scheduled. - Mekhi Eli PA-C

## 2023-03-08 NOTE — ED PROVIDER NOTE - CLINICAL SUMMARY MEDICAL DECISION MAKING FREE TEXT BOX
Dr. Butt (Attending Physician)  32-year-old  female status post  delivery on 2023 presenting with left-sided pressure and stabbing headache.  Denies associated vision changes, double vision, photophobia, neck pain, fevers.  Has previous history of headaches but they are not usually unilateral.  CN 2-12 intact. Strength 5/5 throughout. SILT throughout. Normal finger-to-nose. No pronator drift. Normal gait.  Patient also notes swelling of her lower lower extremities that is not improving since delivery.  She denies abdominal pain nausea vomiting, and her vaginal bleeding is slowly improving. Abdomen soft, nontender, nondistended, no McBurney's point tenderness, negative Good's, no CVAT.  incision is healing well, nodischarge or erythema. She denies shortness of breath or chest pain. RRR, no M/R/G, equal pulses bilaterally, 2+ edema in bilateral LEs up to mid tibia. Breathing comfortably, clear lungs, no wheezes or crackles. Her blood pressure is 111/78 and she does not have a history of preeclampsia.  We will check helpful labs because of headache and recent pregnancy, but likely migraine/primary headache we will give Toradol Reglan Tylenol fluids and reassess.  Low suspicion for cerebral venous sinus thrombosis but if not improving with treatment will consider CT venogram.

## 2023-03-08 NOTE — ED PROVIDER NOTE - PATIENT PORTAL LINK FT
You can access the FollowMyHealth Patient Portal offered by [facility] by registering at the following website: [email or web address]. By joining [employer]’s FollowMyHealth portal, you will also be able to view your health information using other applications (apps) compatible with our system.

## 2023-03-08 NOTE — ED ADULT NURSE NOTE - OBJECTIVE STATEMENT
32 year old female coming in for extremity swelling. Patient had a  on . This is the patients second pregnancy and child. As per patient both pregnancy had no complications both babies born full term, took no medication during pregnancy. Patient state son discharge from this  with patient noted swelling in her ankles, the MD told her it would go down. Patient states the swelling has only increased and now notes swelling in her hand bilaterally. Patient also notes a headache that began yesterday. Patient denies any vision changes or dizziness. No shortness of breath or difficulty breathing. No chest pain, pressure or palpitations. No abdominal pain, nausea or vomiting. Patient does endorse chills but did not take her temperature. Csection site is clean dry and intact. 32 year old female coming in for extremity swelling. Patient had a  on . This is the patients second pregnancy and child. As per patient both pregnancy had no complications both babies born full term, took no medication during pregnancy. Patient state son discharge from this  with patient noted swelling in her ankles, the MD told her it would go down. Patient states the swelling has only increased and now notes swelling in her hand bilaterally. Patient also notes a headache that began yesterday. Patient denies any vision changes or dizziness. No shortness of breath or difficulty breathing. No chest pain, pressure or palpitations. No abdominal pain, nausea or vomiting. Patient does endorse chills but did not take her temperature.  site is clean dry and intact with steri strip present.

## 2023-03-08 NOTE — ED PROVIDER NOTE - CARE PROVIDER_API CALL
Winifred Nelson  Obstetrics and Gynecology  [address] [place] [address], [address]  [place] [address]  Phone: [phone]  Fax: [phone]  Established Patient  Follow Up Time: 4-6 Days

## 2023-03-14 ENCOUNTER — APPOINTMENT (OUTPATIENT)
Dept: OBGYN | Facility: CLINIC | Age: 33
End: 2023-03-14
Payer: COMMERCIAL

## 2023-03-14 VITALS — BODY MASS INDEX: 34.3 KG/M2 | WEIGHT: 219 LBS

## 2023-03-14 PROCEDURE — 0503F POSTPARTUM CARE VISIT: CPT

## 2023-03-16 NOTE — END OF VISIT
[FreeTextEntry3] : I, Servando Arevalo, acted as a scribe on behalf of Dr. Winifred Nelson on 03/14/2023 .\par \par All medical entries made by the scribe were at my, Dr. Winifred Nelson's, direction and personally dictated by me on 03/14/2023. I have reviewed the chart and agree that the record accurately reflects my personal performance of the history, physical exam, assessment and plan. I have also personally directed, reviewed, and agreed with the chart.

## 2023-03-16 NOTE — HISTORY OF PRESENT ILLNESS
[Postpartum Follow Up] : postpartum follow up [Delivery Date: ___] : on [unfilled] [Primary C/S] : delivered by  section [Wt. ___] : weighing [unfilled] [BF with Difficulty] : nursing with difficulty [FreeTextEntry1] : Post-partum visit - CD initial visit\par \par Delivery details: Low segment transverse CD w/ J-extension @40w 23 by HL for baby girl.\par  issues: none\par Delivery complications: none\par  \par Current symptoms and complaints \par Pumping and bottle feeding, found breastfeeding too painful. Denies mastitis sxs. Denies ppd sxs. Reports normal bowel/bladder fxn. Pt was seen in the [facility] ED last week for leg soreness and HA, labs nl and no evidence of DVT. She reports odor from incision site. Pt is managing pain w/o analgesics.\par \par Infant feeding:\par pumping and bottle feeding\par \par Exam:\par -Incision well healed, C/D/I, no evidence of infection\par -no abdominal pain or tenderness\par L/E: Soft, Warm and well perfused\par \par Assessment:\par 33 yo s/p CD, stable.\par \par Plan:\par -incision precautions, keep area dry\par -inter pregnancy interval discussed\par -contraception options discussed-- strongly considering iud\par -mastitis precautions reviewed\par -pelvic rest\par -discussed any future CD should be pre-labor (at about 37wks)\par \par RTO 4-5 weeks for full post partum visit\par

## 2023-04-18 ENCOUNTER — APPOINTMENT (OUTPATIENT)
Dept: OBGYN | Facility: CLINIC | Age: 33
End: 2023-04-18
Payer: COMMERCIAL

## 2023-04-18 VITALS
DIASTOLIC BLOOD PRESSURE: 75 MMHG | HEIGHT: 67 IN | BODY MASS INDEX: 34.37 KG/M2 | SYSTOLIC BLOOD PRESSURE: 114 MMHG | WEIGHT: 219 LBS | HEART RATE: 69 BPM

## 2023-04-18 PROCEDURE — 0503F POSTPARTUM CARE VISIT: CPT

## 2023-04-18 NOTE — HISTORY OF PRESENT ILLNESS
[FreeTextEntry1] : Post-partum visit - CD follow up\par \par Delivery details: Low segment transverse CD w/ J-extension @40w 23 by HL for baby girl.\par  issues: none\par Delivery complications: none\par  \par Current symptoms and complaints \par was breastfeeding/Pumping. now formula/ bottle feeding. stopped breastfeeding 2 weeks ago. Denies mastitis sxs, had some initial breast engorgement but denies mastitis.. Denies ppd sxs. Reports normal bowel/bladder fxn. No more lochia. Pt reports having unprotected intercourse with no issues,  was born w/o vas deferens (which was initial reason for ivf). Leg swelling and HAs have improved.\par \par Infant feeding:\par bottle feeding\par \par Exam:\par -Incision well healed, C/D/I\par -no abdominal pain or tenderness\par -breasts nl biliaterally\par -speculum exam nl\par -BME nl, no CMT\par \par L/E: Soft, Warm and well perfused\par \par Assessment:\par 31 yo s/p CD, stable.\par \par Plan:\par -inter pregnancy interval discussed\par -contraception options discussed-strongly considering iud, advised condoms in interim\par -f/u urology regarding 's fertility, if azospermia, that would be contraception (if oligo, then no)\par -mastitis precautions reviewed\par -postpartum clearance\par -discussed any future CD should be pre-labor (at about 37wks)\par \par rto 2-4wks for iud insertion, if desired\par RTO 3-4 months for annual or prn\par

## 2023-04-18 NOTE — END OF VISIT
[FreeTextEntry3] : I, Servando Arevalo, acted as a scribe on behalf of Dr. Winifred Nelson on 04/18/2023 .\par \par All medical entries made by the scribe were at my, Dr. Winifred Nelson's, direction and personally dictated by me on 04/18/2023. I have reviewed the chart and agree that the record accurately reflects my personal performance of the history, physical exam, assessment and plan. I have also personally directed, reviewed, and agreed with the chart.

## 2023-06-07 ENCOUNTER — NON-APPOINTMENT (OUTPATIENT)
Age: 33
End: 2023-06-07

## 2023-06-10 ENCOUNTER — APPOINTMENT (OUTPATIENT)
Dept: ORTHOPEDIC SURGERY | Facility: CLINIC | Age: 33
End: 2023-06-10

## 2023-06-16 ENCOUNTER — APPOINTMENT (OUTPATIENT)
Dept: ORTHOPEDIC SURGERY | Facility: CLINIC | Age: 33
End: 2023-06-16
Payer: COMMERCIAL

## 2023-06-16 VITALS — WEIGHT: 219 LBS | BODY MASS INDEX: 34.37 KG/M2 | HEIGHT: 67 IN

## 2023-06-16 DIAGNOSIS — M62.830 MUSCLE SPASM OF BACK: ICD-10-CM

## 2023-06-16 DIAGNOSIS — M54.50 LOW BACK PAIN, UNSPECIFIED: ICD-10-CM

## 2023-06-16 DIAGNOSIS — M54.17 RADICULOPATHY, LUMBOSACRAL REGION: ICD-10-CM

## 2023-06-16 DIAGNOSIS — M51.9 UNSPECIFIED THORACIC, THORACOLUMBAR AND LUMBOSACRAL INTERVERTEBRAL DISC DISORDER: ICD-10-CM

## 2023-06-16 PROCEDURE — 72110 X-RAY EXAM L-2 SPINE 4/>VWS: CPT

## 2023-06-16 PROCEDURE — 99204 OFFICE O/P NEW MOD 45 MIN: CPT

## 2023-06-16 PROCEDURE — 72170 X-RAY EXAM OF PELVIS: CPT

## 2023-06-16 RX ORDER — NAPROXEN 500 MG/1
500 TABLET ORAL
Qty: 30 | Refills: 0 | Status: ACTIVE | COMMUNITY
Start: 2023-06-16 | End: 1900-01-01

## 2023-06-16 RX ORDER — GABAPENTIN 100 MG/1
100 CAPSULE ORAL
Qty: 60 | Refills: 1 | Status: ACTIVE | COMMUNITY
Start: 2023-06-16 | End: 1900-01-01

## 2023-06-16 NOTE — HISTORY OF PRESENT ILLNESS
[Lower back] : lower back [Sudden] : sudden [8] : 8 [Dull/Aching] : dull/aching [Radiating] : radiating [Constant] : constant [Meds] : meds [de-identified] : 6/16/23- 34 y/o F presents for bilateral lower back pain X several years, worsening  in the last week. Denies specific injury. 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 Associated radiation to R glute and midway down R posterior thigh, without N/T. Went to outside UC 1 week ago and RXed steroids, with partial relief. Has not done PT. Denies b/b dysfunction.Reports history of lumbar disc herniation. Denies prior back surgeries/injections. \par \par PMH: denies [] : no [FreeTextEntry7] : right leg [FreeTextEntry5] : LUCEROMARILYN is a 34 yo F presenting for low back pain that started 10 + years ago. Worsened last week. Went to UC; given steroids- helped.

## 2023-06-16 NOTE — ASSESSMENT
[FreeTextEntry1] : mild L5-S1 DDD with RLE radic\par PT, meds\par f/u 6 weeks\par consider MRI if no improvement\par \par NSAIDs- Patient warned of risk of medication to GI tract, increased blood pressure, cardiac risk, and risk of fluid retention.  Advised to clear medication with internist or PCP if any concurrent health problem with heart, blood pressure, or GI system exists.\par \par Gabapentin- Patient advised of sedating effects, instructed not to drive, operate machinery, or take with other sedating medications. Advised of need to taper on/off medication and risk of abruptly stopping gabapentin.\par \par \par Patient seen by Lori Samuel PA-C, with and under the supervision of  Dr. Hector Valadez M.D.\par

## 2023-06-25 ENCOUNTER — NON-APPOINTMENT (OUTPATIENT)
Age: 33
End: 2023-06-25

## 2023-07-28 ENCOUNTER — APPOINTMENT (OUTPATIENT)
Dept: ORTHOPEDIC SURGERY | Facility: CLINIC | Age: 33
End: 2023-07-28

## 2023-10-13 ENCOUNTER — NON-APPOINTMENT (OUTPATIENT)
Age: 33
End: 2023-10-13

## 2023-12-31 PROBLEM — Z11.3 ENCOUNTER FOR SCREENING EXAMINATION FOR SEXUALLY TRANSMITTED DISEASE: Status: RESOLVED | Noted: 2022-07-27 | Resolved: 2022-08-10

## 2024-05-15 ENCOUNTER — APPOINTMENT (OUTPATIENT)
Dept: OBGYN | Facility: CLINIC | Age: 34
End: 2024-05-15
Payer: COMMERCIAL

## 2024-05-15 VITALS
HEIGHT: 67 IN | WEIGHT: 210 LBS | BODY MASS INDEX: 32.96 KG/M2 | DIASTOLIC BLOOD PRESSURE: 71 MMHG | SYSTOLIC BLOOD PRESSURE: 103 MMHG

## 2024-05-15 DIAGNOSIS — Z11.51 ENCOUNTER FOR SCREENING FOR HUMAN PAPILLOMAVIRUS (HPV): ICD-10-CM

## 2024-05-15 DIAGNOSIS — Z13.31 ENCOUNTER FOR SCREENING FOR DEPRESSION: ICD-10-CM

## 2024-05-15 DIAGNOSIS — Z78.9 OTHER SPECIFIED HEALTH STATUS: ICD-10-CM

## 2024-05-15 DIAGNOSIS — Z12.4 ENCOUNTER FOR SCREENING FOR MALIGNANT NEOPLASM OF CERVIX: ICD-10-CM

## 2024-05-15 PROCEDURE — 99395 PREV VISIT EST AGE 18-39: CPT

## 2024-05-15 PROCEDURE — 36415 COLL VENOUS BLD VENIPUNCTURE: CPT

## 2024-05-15 NOTE — COUNSELING
[Nutrition/ Exercise/ Weight Management] : nutrition, exercise, weight management [Vitamins/Supplements] : vitamins/supplements [Breast Self Exam] : breast self exam [Contraception/ Emergency Contraception/ Safe Sexual Practices] : contraception, emergency contraception, safe sexual practices [Confidentiality] : confidentiality [Lab Results] : lab results [Medication Management] : medication management

## 2024-05-15 NOTE — PLAN
[FreeTextEntry1] : Routine GYN Exam -Discussed and reviewed importance of monthly BSE -Accepts STI testing, importance safe sexual practices discussed -Pap/HPV test collected and sent at todays visit -s/p Gardasil -declines contracpetion -mammo yearly @ 35 -f/u PCP for recommended HCM, vaccinations and CA screening  rto 1 yr or sooner as needed.

## 2024-05-15 NOTE — HISTORY OF PRESENT ILLNESS
[Patient reported PAP Smear was abnormal] : Patient reported PAP Smear was abnormal [FreeTextEntry1] : 33 y/o P1 LMP 4/25/24 presents for annual GYN exam. Doing well no complaints, regular cycles, not interested in contraception, politely declines.   Low segment transverse CD w/ J-extension @40w 2/28/23 by  for baby girl. 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 ASCUS pap, +HPV 2022, no colpo Accepts STI screening Mother + breast CA, in her 60s.   [Mammogramdate] : 2 yes ago [TextBox_19] : notmal [PapSmeardate] : 9/2022 [TextBox_31] : ASCUS,.+HPV [Patient refuses STI testing] : Patient refuses STI testing

## 2024-05-16 LAB
HBV SURFACE AG SER QL: NONREACTIVE
HCV AB SER QL: NONREACTIVE
HCV S/CO RATIO: 0.14 S/CO
HIV1+2 AB SPEC QL IA.RAPID: NONREACTIVE
HPV HIGH+LOW RISK DNA PNL CVX: NOT DETECTED
T PALLIDUM AB SER QL IA: NEGATIVE

## 2024-05-17 LAB
C TRACH RRNA SPEC QL NAA+PROBE: NOT DETECTED
N GONORRHOEA RRNA SPEC QL NAA+PROBE: NOT DETECTED
SOURCE TP AMPLIFICATION: NORMAL

## 2024-05-20 LAB — CYTOLOGY CVX/VAG DOC THIN PREP: ABNORMAL

## 2024-05-28 NOTE — OB RN PATIENT PROFILE - TEACHING/LEARNING FACTORS INFLUENCE READINESS TO LEARN
"[facility]  Physical Therapy Treatment Note     Name: Omar Pacheco  Clinic Number: [id number]    Therapy Diagnosis:   Encounter Diagnoses   Name Primary?    Decreased range of motion of trunk and back Yes    Decreased strength of trunk and back            Physician: Donya Hickey MD    Visit Date: 5/28/2024    Physician Orders: PT Eval and Treat  Medical Diagnosis from Referral: M48.07 (ICD-10-CM) - Spinal stenosis, lumbosacral region   Evaluation Date: 4/15/2024  Authorization Period Expiration: 2/27/2025  Plan of Care Expiration: 6/25/2024  Reassessment Due: 5/16/2024  Visit # / Visits authorized: 12/20  MedX testing visit 2     Time In: 10:38 AM  Time Out: 11:40AM  Total Billable Time: 58 minutes  INSURANCE and OUTCOMES: Fee for Service with FOTO Outcomes 1/3     Precautions: standard, HTN, fall, and history of cancer     Pattern of pain determined: 1 PEN    PTA Visit #: 1/5     Subjective     Omar Pacheco reports increase soreness today due to attending a concert over the weekend with low seating and ascending/descending stairs.    Patient reports tolerating previous visit well with mild soreness  Patient reports their pain to be 5/10 on a 0-10 scale with 0 being no pain and 10 being the worst pain imaginable.  Pain Location: central LB and TARIK knees     Occupation: Retired  Leisure: [employer], [employer], dancing, travelling       Pts goals: "Be able to stand and walk for longer so that I can do the things I need to without pain"    Objective      Lumbar  Isometric Testing on Med X equipment: Testing administered by PT    Test Initial Baseline Midpoint Final   Date 4/17/2024 5/28/2024    ROM 0-42 deg 0-60 deg    Max Peak Torque 142  39    Min Peak Torque 34  25    Flex/Ext Ratio 4.17:1 1.56:1    % variance  normative data 46% 63%    % change from initial test N/A visit 1 350        Outcomes:  Intake Score: 41%  Visit 6 Score: 48%  Visit 10 Score:  " "43%  Discharge Score:  Goal Score: 57%     Treatment   (Bold = completed)   Omar received the treatments listed below:            Omar participated in neuromuscular re-education activities to improve balance, coordination, proprioception, motor control and/or posture for - minutes. The following activities were included:       Omar received therapeutic exercises to develop/improve posture, lumbar ROM, strength, and muscular endurance for 53 minutes including the following exercises:     LTR  2x10x5"  DKTC c/ PB x10"x5  Open book str 10"x 5  PPT 5x5"  Posterior pelvic tilt 10x + march  Posterior pelvic tilt 10x + BKFO GTB  TrA brace isometric x10 x 5"  SOC 15x5" - NP  Bridge  2x10 GTB  BRIAN with pillow 2" - NP  seated piriformis  stretch x2x30"  supine SLR x10   seated ball roll outs x5x10"  seated nerve glides x10     Medical MedX Treatment as follows:  Patient received 00 minutes via participation on the Dealised Machine. Therapist assisted patient in isolating and engaging spinal stabilization musculature in order to improve functional ability and postural control. Patient performed exercise with therapist guidance in order to accurately use pacer function, avoid valsalva, and optimally exert effort within a safe and effective range via the Cedrick Exertion Rating Scale. Patient instructed to perform at a midrange of exertion and to complete 15-20 repetitions within appropriate split time, with proper technique, and while maintaining safety.           5/28/2024    11:09 AM   [facility]   Visit Number 13   VAS Pain Rating 5   Flexion in Lying 10   Lumbar Weight 80 lbs   Repetitions 15   Rating of Perceived Exertion 3   Ice - Z Lie (in min.) 5         Peripheral muscle strengthening which included one set of 15-20 repetitions at a slow and controlled 10-13 second per rep pace focused on strengthening supporting musculature in order to improve body mechanics and functional mobility. Patient and " therapist focused on proper form during treatment to ensure optimal strengthening of each targeted muscle group.  Machines utilized included:Torso rotation, Leg Ext, Leg Curl, Chest Press, and Rowing,Torso rotation, Leg Ext, Leg Curl, Chest Press, and Rowing      Omar participated in dynamic functional therapeutic activities to improve functional performance and simulate household and community activities for -  minutes. The following activities were included:    -    Omar received manual therapy techniques for 5 minutes. The following activities were included:  Prone sacral joint mobs/distractions, grade I/II for pain relief     Pt given cold pack for 5 minutes to low back  in z-lie.    Patient Education and Home Exercises     Home exercises include:  LTR on ball   DKTC  PPT   Bridge   Seated ball rollouts   Seated piriformis stretch   SLR  Supine nerve glides     Cardio program (V5): - Stationary bike 30 min and 30 min on elliptical 2x/week  Lifting education (V11): -  Posture/Lumbar roll: yes  Fridge Magnet Discharge handout (date given): -  Equipment at home/gym membership: no - Has stationary bike at home    Education provided:   - PT role and POC  - HEP    Written Home Exercises Provided: Patient instructed to cont prior HEP.  Exercises were reviewed and Omar was able to demonstrate them prior to the end of the session.  Omar demonstrated good  understanding of the education provided.     See EMR under Patient Instructions for exercises provided prior visit.    Assessment     Omar presents to therapy with increase pain due to concert attendance. He was able to perform therex w/o exacerbation of pain, requiring min cues throughout Rx. Jason cream was applied to patient's knees pre session due to discomfort and for tolerance with LE peripheral muscle strengthening.  Lumbar MedX resistance increased from 78 to 80 lbs with decrease in reps from 20 to 15 at 3/10 RPE.  Peripheral muscle strength  training completed without any discomfort with slight increase resistance and reps.    Patient is making good progress towards established goals.  Pt will continue to benefit from skilled outpatient physical therapy to address the deficits stated in the impairment chart, provide pt/family education and to maximize pt's level of independence in the home and community environment.     Anticipated Barriers for therapy: chronicity of condition, herniated lumbar disc, stenosis     Pt's spiritual, cultural and educational needs considered and pt agreeable to plan of care and goals as stated below:     GOALS: Pt is in agreement with the following goals.     Short term goals:  6 weeks or 10 visits   - Pt will demonstrate increased lumbar MedX ROM by at least 6 degrees from the initial ROM value with improvements noted in functional ROM and ability to perform ADLs. Appropriate and Ongoing  - Pt will demonstrate increased MedX average isometric strength value by 15% from initial test resulting in improved ability to perform bending, lifting, and carrying activities safely, confidently. Appropriate and Ongoing  - Pt will report a reduction in worst pain score by 1-2 points for improved tolerance for standing. Appropriate and Ongoing  - Pt able to perform HEP correctly with minimal cueing or supervision from therapist to encourage independent management of symptoms. Appropriate and Ongoing     Long term goals: 10 weeks or 20 visits   - Pt will demonstrate increased lumbar MedX ROM by at least 9 degrees from initial ROM value, resulting in improved ability to perform functional forward bending while standing and sitting. Appropriate and Ongoing  - Pt will demonstrate increased MedX average isometric strength value by 30% from initial test resulting in improved ability to perform bending, lifting, and carrying activities safely and confidently. Appropriate and Ongoing  - Pt to demonstrate ability to independently control and  reduce their pain through posture positioning and mechanical movements throughout a typical day. Appropriate and Ongoing  - Pt will demonstrate reduced pain and improved functional outcomes as reported on the FOTO by reaching an intake score of >/= 57% functional ability in order to demonstrate subjective improvement in patient's condition. . Appropriate and Ongoing  - Pt will demonstrate independence with the HEP at discharge. Appropriate and Ongoing  - Pt will be able to stand and walk for 1 hour in order to have improved tolerance to ADL's and leisure activities. Appropriate and Ongoing       Plan     Continue with established Plan of Care towards established PT goals.     Therapist: Romelia Ceballos, PTA  5/28/2024                           none

## 2024-07-02 ENCOUNTER — NON-APPOINTMENT (OUTPATIENT)
Age: 34
End: 2024-07-02

## 2024-10-28 ENCOUNTER — NON-APPOINTMENT (OUTPATIENT)
Age: 34
End: 2024-10-28

## 2024-12-20 ENCOUNTER — APPOINTMENT (OUTPATIENT)
Dept: ORTHOPEDIC SURGERY | Facility: CLINIC | Age: 34
End: 2024-12-20
Payer: COMMERCIAL

## 2024-12-20 DIAGNOSIS — M51.369: ICD-10-CM

## 2024-12-20 PROCEDURE — 99215 OFFICE O/P EST HI 40 MIN: CPT

## 2024-12-20 RX ORDER — GABAPENTIN 100 MG/1
100 CAPSULE ORAL
Qty: 60 | Refills: 1 | Status: ACTIVE | COMMUNITY
Start: 2024-12-20 | End: 1900-01-01

## 2025-02-06 ENCOUNTER — NON-APPOINTMENT (OUTPATIENT)
Age: 35
End: 2025-02-06

## 2025-02-21 ENCOUNTER — APPOINTMENT (OUTPATIENT)
Dept: ORTHOPEDIC SURGERY | Facility: CLINIC | Age: 35
End: 2025-02-21

## 2025-04-29 NOTE — ED ADULT NURSE NOTE - PAIN: PRESENCE, MLM
How Severe Are Your Spot(S)?: moderate What Type Of Note Output Would You Prefer (Optional)?: Standard Output What Is The Reason For Today's Visit?: Full Body Skin Examination What Is The Reason For Today's Visit? (Being Monitored For X): the development of a new lesion denies pain/discomfort (Rating = 0)

## 2025-06-12 ENCOUNTER — APPOINTMENT (OUTPATIENT)
Dept: OBGYN | Facility: CLINIC | Age: 35
End: 2025-06-12
Payer: COMMERCIAL

## 2025-06-12 ENCOUNTER — TRANSCRIPTION ENCOUNTER (OUTPATIENT)
Age: 35
End: 2025-06-12

## 2025-06-12 VITALS
SYSTOLIC BLOOD PRESSURE: 113 MMHG | WEIGHT: 210.63 LBS | DIASTOLIC BLOOD PRESSURE: 77 MMHG | HEIGHT: 66 IN | BODY MASS INDEX: 33.85 KG/M2

## 2025-06-12 PROBLEM — G43.829 MENSTRUAL MIGRAINE: Status: ACTIVE | Noted: 2025-06-12

## 2025-06-12 PROBLEM — Z12.39 SCREENING FOR BREAST CANCER: Status: ACTIVE | Noted: 2025-06-12

## 2025-06-12 PROCEDURE — 99395 PREV VISIT EST AGE 18-39: CPT

## 2025-06-12 PROCEDURE — 36415 COLL VENOUS BLD VENIPUNCTURE: CPT

## 2025-06-12 PROCEDURE — 99212 OFFICE O/P EST SF 10 MIN: CPT | Mod: 25

## 2025-06-12 RX ORDER — LEVONORGESTREL / ETHINYL ESTRADIOL AND ETHINYL ESTRADIOL 150-30(84)
0.15-0.03 &0.01 KIT ORAL
Qty: 1 | Refills: 3 | Status: ACTIVE | COMMUNITY
Start: 2025-06-12 | End: 1900-01-01

## 2025-06-13 LAB
HBV SURFACE AG SER QL: NONREACTIVE
HCV AB SER QL: NONREACTIVE
HCV S/CO RATIO: 0.17 S/CO
HIV1+2 AB SPEC QL IA.RAPID: NONREACTIVE

## 2025-06-16 ENCOUNTER — NON-APPOINTMENT (OUTPATIENT)
Age: 35
End: 2025-06-16

## 2025-06-17 LAB
C TRACH RRNA SPEC QL NAA+PROBE: NOT DETECTED
CYTOLOGY CVX/VAG DOC THIN PREP: ABNORMAL
HPV HIGH+LOW RISK DNA PNL CVX: NOT DETECTED
N GONORRHOEA RRNA SPEC QL NAA+PROBE: NOT DETECTED
SOURCE TP AMPLIFICATION: NORMAL
T PALLIDUM AB SER QL IA: NEGATIVE

## 2025-06-30 ENCOUNTER — NON-APPOINTMENT (OUTPATIENT)
Age: 35
End: 2025-06-30